# Patient Record
Sex: FEMALE
[De-identification: names, ages, dates, MRNs, and addresses within clinical notes are randomized per-mention and may not be internally consistent; named-entity substitution may affect disease eponyms.]

---

## 2020-08-06 ENCOUNTER — TRANSCRIPTION ENCOUNTER (OUTPATIENT)
Age: 50
End: 2020-08-06

## 2021-05-06 ENCOUNTER — EMERGENCY (EMERGENCY)
Facility: HOSPITAL | Age: 51
LOS: 1 days | Discharge: HOME | End: 2021-05-06
Attending: EMERGENCY MEDICINE
Payer: COMMERCIAL

## 2021-05-06 VITALS
RESPIRATION RATE: 20 BRPM | OXYGEN SATURATION: 98 % | SYSTOLIC BLOOD PRESSURE: 143 MMHG | HEART RATE: 67 BPM | DIASTOLIC BLOOD PRESSURE: 87 MMHG | WEIGHT: 184.97 LBS | HEIGHT: 65 IN | TEMPERATURE: 98 F

## 2021-05-06 PROCEDURE — 99283 EMERGENCY DEPT VISIT LOW MDM: CPT | Mod: 25

## 2021-05-06 PROCEDURE — 99284 EMERGENCY DEPT VISIT MOD MDM: CPT

## 2021-05-06 PROCEDURE — 96372 THER/PROPH/DIAG INJ SC/IM: CPT

## 2021-05-06 PROCEDURE — 72100 X-RAY EXAM L-S SPINE 2/3 VWS: CPT

## 2021-05-06 PROCEDURE — 72100 X-RAY EXAM L-S SPINE 2/3 VWS: CPT | Mod: 26

## 2021-05-06 RX ORDER — DEXAMETHASONE 0.5 MG/5ML
8 ELIXIR ORAL ONCE
Refills: 0 | Status: COMPLETED | OUTPATIENT
Start: 2021-05-06 | End: 2021-05-06

## 2021-05-06 RX ORDER — IBUPROFEN 200 MG
1 TABLET ORAL
Qty: 15 | Refills: 0
Start: 2021-05-06

## 2021-05-06 RX ORDER — LIDOCAINE 4 G/100G
1 CREAM TOPICAL
Qty: 10 | Refills: 0
Start: 2021-05-06 | End: 2021-05-15

## 2021-05-06 RX ORDER — METHOCARBAMOL 500 MG/1
1000 TABLET, FILM COATED ORAL ONCE
Refills: 0 | Status: COMPLETED | OUTPATIENT
Start: 2021-05-06 | End: 2021-05-06

## 2021-05-06 RX ORDER — METHOCARBAMOL 500 MG/1
1 TABLET, FILM COATED ORAL
Qty: 15 | Refills: 0
Start: 2021-05-06 | End: 2021-05-10

## 2021-05-06 RX ORDER — KETOROLAC TROMETHAMINE 30 MG/ML
30 SYRINGE (ML) INJECTION ONCE
Refills: 0 | Status: DISCONTINUED | OUTPATIENT
Start: 2021-05-06 | End: 2021-05-06

## 2021-05-06 RX ORDER — LIDOCAINE 4 G/100G
1 CREAM TOPICAL ONCE
Refills: 0 | Status: COMPLETED | OUTPATIENT
Start: 2021-05-06 | End: 2021-05-06

## 2021-05-06 RX ORDER — OXYCODONE AND ACETAMINOPHEN 5; 325 MG/1; MG/1
2 TABLET ORAL ONCE
Refills: 0 | Status: DISCONTINUED | OUTPATIENT
Start: 2021-05-06 | End: 2021-05-06

## 2021-05-06 RX ADMIN — METHOCARBAMOL 1000 MILLIGRAM(S): 500 TABLET, FILM COATED ORAL at 14:00

## 2021-05-06 RX ADMIN — OXYCODONE AND ACETAMINOPHEN 2 TABLET(S): 5; 325 TABLET ORAL at 12:27

## 2021-05-06 RX ADMIN — Medication 30 MILLIGRAM(S): at 12:27

## 2021-05-06 RX ADMIN — Medication 8 MILLIGRAM(S): at 14:00

## 2021-05-06 NOTE — ED PROVIDER NOTE - CLINICAL SUMMARY MEDICAL DECISION MAKING FREE TEXT BOX
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

## 2021-05-06 NOTE — ED PROVIDER NOTE - PATIENT PORTAL LINK FT
You can access the FollowMyHealth Patient Portal offered by [facility] by registering at the following website: [email or web address]. By joining [employer]’s FollowMyHealth portal, you will also be able to view your health information using other applications (apps) compatible with our system.

## 2021-05-06 NOTE — ED PROVIDER NOTE - OBJECTIVE STATEMENT
50 y.o female Pmh of HTN on med and hx of LBP S.p tripped and fell on the ICE 2016 have received the 3 dose of epidural injection last one was [id number]. pt presents in ER and c.o 2 days sever LBP goes to the right hip . 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 she came home and she got 2 motrin for the pain and went to bed . denies any recent fall or trauma or injury . pain is so sever 10/10 worse by movement and standing up from stinting position  and can not move now due to pain .   she denies any loss of bowel or bladder continence . denies any pregnancy s.p total hysterectomy

## 2021-05-06 NOTE — ED PROVIDER NOTE - PROGRESS NOTE DETAILS
pt is requesting image study, despite explanation that she did under treat the LBP . dose not need image study always

## 2021-05-06 NOTE — ED PROVIDER NOTE - NSFOLLOWUPINSTRUCTIONS_ED_ALL_ED_FT
Back Pain    Back pain is very common in adults. The cause of back pain is rarely dangerous and the pain often gets better over time. The cause of your back pain may not be known and may include strain of muscles or ligaments, degeneration of the spinal disks, or arthritis. Occasionally the pain may radiate down your leg(s). Over-the-counter medicines to reduce pain and inflammation are often the most helpful. Stretching and remaining active frequently helps the healing process.     SEEK IMMEDIATE MEDICAL CARE IF YOU HAVE ANY OF THE FOLLOWING SYMPTOMS: bowel or bladder control problems, unusual weakness or numbness in your arms or legs, nausea or vomiting, abdominal pain, fever, dizziness/lightheadedness. please make sure take medications prescribed for the pain   please avoid lifting bending  , bending and twisting   please call and follow up with primary care within 1-2days and follow up with spine      Back Pain    Back pain is very common in adults. The cause of back pain is rarely dangerous and the pain often gets better over time. The cause of your back pain may not be known and may include strain of muscles or ligaments, degeneration of the spinal disks, or arthritis. Occasionally the pain may radiate down your leg(s). Over-the-counter medicines to reduce pain and inflammation are often the most helpful. Stretching and remaining active frequently helps the healing process.     SEEK IMMEDIATE MEDICAL CARE IF YOU HAVE ANY OF THE FOLLOWING SYMPTOMS: bowel or bladder control problems, unusual weakness or numbness in your arms or legs, nausea or vomiting, abdominal pain, fever, dizziness/lightheadedness.    Constipation    Constipation is when a person has fewer than three bowel movements a week, has difficulty having a bowel movement, or has stools that are dry, hard, or larger than normal. Other symptoms can include abdominal pain or bloating. As people grow older, constipation is more common. A low-fiber diet, not taking in enough fluids, and taking certain medicines, including opioid painkillers, may make constipation worse. Treatment varies but may include dietary modifications (more fiber-rich foods), lifestyle modifications, and possible medications.     SEEK IMMEDIATE MEDICAL CARE IF YOU HAVE ANY OF THE FOLLOWING SYMPTOMS: bright red blood in your stool, constipation for longer than 4 days, abdominal or rectal pain, unexplained weight loss, or inability to pass gas.

## 2021-05-06 NOTE — ED PROVIDER NOTE - CARE PROVIDER_API CALL
Norris Anderson; PhD)  Neurosurgery  [address] [place] [address]  Phone: [phone]  Fax: [phone]  Follow Up Time:

## 2021-05-06 NOTE — ED PROVIDER NOTE - PHYSICAL EXAMINATION
Const: AOX3 nontoxic appearing, no apparent respiratory or physical distress. sitting on the wheelchair   Eyes: YSABEL. EOMI  Neck: Soft and supple. Full ROM without pain.  Cardiac: Regular rate and regular rhythm. +S1/S2.   Resp: Speaking in full sentences. No evidence of respiratory distress.   Abd: Soft, non-tender, non-distended. Normal bowel sounds in all 4 quadrants. No guarding or rebound.  Back: Spine midline and non-tender. No CVAT. LE strength grossly intact , light touch sensation grossly intact ,    Skin: No evidence rashes,   Neuro: Awake, alert & oriented x 3. Moves all extremities symmetrically. moves LE with pain in lower back

## 2021-05-06 NOTE — ED PROVIDER NOTE - ATTENDING CONTRIBUTION TO CARE
MD ACP  acute exacerbation of chronic back pain  not traumatic  pe as doc  agree w ith treatment and dispo

## 2021-05-10 PROBLEM — Z00.00 ENCOUNTER FOR PREVENTIVE HEALTH EXAMINATION: Status: ACTIVE | Noted: 2021-05-10

## 2021-05-12 ENCOUNTER — APPOINTMENT (OUTPATIENT)
Dept: NEUROSURGERY | Facility: CLINIC | Age: 51
End: 2021-05-12

## 2024-07-26 ENCOUNTER — INPATIENT (INPATIENT)
Facility: HOSPITAL | Age: 54
LOS: 2 days | Discharge: HOME | End: 2024-07-29
Attending: STUDENT IN AN ORGANIZED HEALTH CARE EDUCATION/TRAINING PROGRAM | Admitting: INTERNAL MEDICINE
Payer: MEDICARE

## 2024-07-26 ENCOUNTER — RESULT REVIEW (OUTPATIENT)
Age: 54
End: 2024-07-26

## 2024-07-26 VITALS
TEMPERATURE: 98 F | WEIGHT: 169.98 LBS | RESPIRATION RATE: 20 BRPM | OXYGEN SATURATION: 100 % | DIASTOLIC BLOOD PRESSURE: 140 MMHG | SYSTOLIC BLOOD PRESSURE: 204 MMHG | HEIGHT: 66 IN | HEART RATE: 80 BPM

## 2024-07-26 PROBLEM — I10 ESSENTIAL (PRIMARY) HYPERTENSION: Chronic | Status: ACTIVE | Noted: 2021-05-06

## 2024-07-26 LAB
ALBUMIN SERPL ELPH-MCNC: 3.5 G/DL — SIGNIFICANT CHANGE UP (ref 3.3–5)
ALBUMIN SERPL ELPH-MCNC: 4.3 G/DL — SIGNIFICANT CHANGE UP (ref 3.3–5)
ALP SERPL-CCNC: 106 U/L — SIGNIFICANT CHANGE UP (ref 40–120)
ALP SERPL-CCNC: 79 U/L — SIGNIFICANT CHANGE UP (ref 40–120)
ALT FLD-CCNC: 17 U/L — SIGNIFICANT CHANGE UP (ref 12–78)
ALT FLD-CCNC: 17 U/L — SIGNIFICANT CHANGE UP (ref 12–78)
ANION GAP SERPL CALC-SCNC: 11 MMOL/L — SIGNIFICANT CHANGE UP (ref 5–17)
ANION GAP SERPL CALC-SCNC: 4 MMOL/L — LOW (ref 5–17)
APPEARANCE UR: CLEAR — SIGNIFICANT CHANGE UP
APTT BLD: 25.4 SEC — SIGNIFICANT CHANGE UP (ref 24.5–35.6)
AST SERPL-CCNC: 27 U/L — SIGNIFICANT CHANGE UP (ref 15–37)
AST SERPL-CCNC: 47 U/L — HIGH (ref 15–37)
BASOPHILS # BLD AUTO: 0.02 K/UL — SIGNIFICANT CHANGE UP (ref 0–0.2)
BASOPHILS # BLD AUTO: 0.04 K/UL — SIGNIFICANT CHANGE UP (ref 0–0.2)
BASOPHILS NFR BLD AUTO: 0.2 % — SIGNIFICANT CHANGE UP (ref 0–2)
BASOPHILS NFR BLD AUTO: 0.4 % — SIGNIFICANT CHANGE UP (ref 0–2)
BILIRUB SERPL-MCNC: 0.6 MG/DL — SIGNIFICANT CHANGE UP (ref 0.2–1.2)
BILIRUB SERPL-MCNC: 0.7 MG/DL — SIGNIFICANT CHANGE UP (ref 0.2–1.2)
BILIRUB UR-MCNC: NEGATIVE — SIGNIFICANT CHANGE UP
BUN SERPL-MCNC: 16 MG/DL — SIGNIFICANT CHANGE UP (ref 7–23)
BUN SERPL-MCNC: 21 MG/DL — SIGNIFICANT CHANGE UP (ref 7–23)
CALCIUM SERPL-MCNC: 10.6 MG/DL — HIGH (ref 8.5–10.1)
CALCIUM SERPL-MCNC: 9.8 MG/DL — SIGNIFICANT CHANGE UP (ref 8.5–10.1)
CHLORIDE SERPL-SCNC: 103 MMOL/L — SIGNIFICANT CHANGE UP (ref 96–108)
CHLORIDE SERPL-SCNC: 108 MMOL/L — SIGNIFICANT CHANGE UP (ref 96–108)
CK MB BLD-MCNC: 1.4 % — SIGNIFICANT CHANGE UP (ref 0–3.5)
CK MB CFR SERPL CALC: 1.9 NG/ML — SIGNIFICANT CHANGE UP (ref 0.5–3.6)
CK SERPL-CCNC: 132 U/L — SIGNIFICANT CHANGE UP (ref 26–192)
CK SERPL-CCNC: 207 U/L — HIGH (ref 26–192)
CO2 SERPL-SCNC: 25 MMOL/L — SIGNIFICANT CHANGE UP (ref 22–31)
CO2 SERPL-SCNC: 27 MMOL/L — SIGNIFICANT CHANGE UP (ref 22–31)
COLOR SPEC: YELLOW — SIGNIFICANT CHANGE UP
CREAT SERPL-MCNC: 1.5 MG/DL — HIGH (ref 0.5–1.3)
CREAT SERPL-MCNC: 1.53 MG/DL — HIGH (ref 0.5–1.3)
DIFF PNL FLD: NEGATIVE — SIGNIFICANT CHANGE UP
EGFR: 40 ML/MIN/1.73M2 — LOW
EGFR: 41 ML/MIN/1.73M2 — LOW
EOSINOPHIL # BLD AUTO: 0.1 K/UL — SIGNIFICANT CHANGE UP (ref 0–0.5)
EOSINOPHIL # BLD AUTO: 0.28 K/UL — SIGNIFICANT CHANGE UP (ref 0–0.5)
EOSINOPHIL NFR BLD AUTO: 1 % — SIGNIFICANT CHANGE UP (ref 0–6)
EOSINOPHIL NFR BLD AUTO: 3.1 % — SIGNIFICANT CHANGE UP (ref 0–6)
GLUCOSE SERPL-MCNC: 109 MG/DL — HIGH (ref 70–99)
GLUCOSE SERPL-MCNC: 85 MG/DL — SIGNIFICANT CHANGE UP (ref 70–99)
GLUCOSE UR QL: NEGATIVE MG/DL — SIGNIFICANT CHANGE UP
HCT VFR BLD CALC: 33.7 % — LOW (ref 34.5–45)
HCT VFR BLD CALC: 35.4 % — SIGNIFICANT CHANGE UP (ref 34.5–45)
HGB BLD-MCNC: 10.9 G/DL — LOW (ref 11.5–15.5)
HGB BLD-MCNC: 11.3 G/DL — LOW (ref 11.5–15.5)
IMM GRANULOCYTES NFR BLD AUTO: 0.2 % — SIGNIFICANT CHANGE UP (ref 0–0.9)
IMM GRANULOCYTES NFR BLD AUTO: 0.2 % — SIGNIFICANT CHANGE UP (ref 0–0.9)
INR BLD: 1.1 RATIO — SIGNIFICANT CHANGE UP (ref 0.85–1.18)
KETONES UR-MCNC: NEGATIVE MG/DL — SIGNIFICANT CHANGE UP
LEUKOCYTE ESTERASE UR-ACNC: NEGATIVE — SIGNIFICANT CHANGE UP
LYMPHOCYTES # BLD AUTO: 2.41 K/UL — SIGNIFICANT CHANGE UP (ref 1–3.3)
LYMPHOCYTES # BLD AUTO: 2.74 K/UL — SIGNIFICANT CHANGE UP (ref 1–3.3)
LYMPHOCYTES # BLD AUTO: 26.4 % — SIGNIFICANT CHANGE UP (ref 13–44)
LYMPHOCYTES # BLD AUTO: 26.5 % — SIGNIFICANT CHANGE UP (ref 13–44)
MAGNESIUM SERPL-MCNC: 2.1 MG/DL — SIGNIFICANT CHANGE UP (ref 1.6–2.6)
MCHC RBC-ENTMCNC: 24.6 PG — LOW (ref 27–34)
MCHC RBC-ENTMCNC: 25.1 PG — LOW (ref 27–34)
MCHC RBC-ENTMCNC: 31.9 G/DL — LOW (ref 32–36)
MCHC RBC-ENTMCNC: 32.3 G/DL — SIGNIFICANT CHANGE UP (ref 32–36)
MCV RBC AUTO: 77.1 FL — LOW (ref 80–100)
MCV RBC AUTO: 77.5 FL — LOW (ref 80–100)
MONOCYTES # BLD AUTO: 0.49 K/UL — SIGNIFICANT CHANGE UP (ref 0–0.9)
MONOCYTES # BLD AUTO: 0.63 K/UL — SIGNIFICANT CHANGE UP (ref 0–0.9)
MONOCYTES NFR BLD AUTO: 4.7 % — SIGNIFICANT CHANGE UP (ref 2–14)
MONOCYTES NFR BLD AUTO: 6.9 % — SIGNIFICANT CHANGE UP (ref 2–14)
NEUTROPHILS # BLD AUTO: 5.77 K/UL — SIGNIFICANT CHANGE UP (ref 1.8–7.4)
NEUTROPHILS # BLD AUTO: 6.96 K/UL — SIGNIFICANT CHANGE UP (ref 1.8–7.4)
NEUTROPHILS NFR BLD AUTO: 63.2 % — SIGNIFICANT CHANGE UP (ref 43–77)
NEUTROPHILS NFR BLD AUTO: 67.2 % — SIGNIFICANT CHANGE UP (ref 43–77)
NITRITE UR-MCNC: NEGATIVE — SIGNIFICANT CHANGE UP
NRBC # BLD: 0 /100 WBCS — SIGNIFICANT CHANGE UP (ref 0–0)
NRBC # BLD: 0 /100 WBCS — SIGNIFICANT CHANGE UP (ref 0–0)
NT-PROBNP SERPL-SCNC: 330 PG/ML — HIGH (ref 0–125)
PH UR: 7.5 — SIGNIFICANT CHANGE UP (ref 5–8)
PHOSPHATE SERPL-MCNC: 3.7 MG/DL — SIGNIFICANT CHANGE UP (ref 2.5–4.5)
PLATELET # BLD AUTO: 368 K/UL — SIGNIFICANT CHANGE UP (ref 150–400)
PLATELET # BLD AUTO: SIGNIFICANT CHANGE UP K/UL (ref 150–400)
POTASSIUM SERPL-MCNC: 2.9 MMOL/L — CRITICAL LOW (ref 3.5–5.3)
POTASSIUM SERPL-MCNC: 3.8 MMOL/L — SIGNIFICANT CHANGE UP (ref 3.5–5.3)
POTASSIUM SERPL-SCNC: 2.9 MMOL/L — CRITICAL LOW (ref 3.5–5.3)
POTASSIUM SERPL-SCNC: 3.8 MMOL/L — SIGNIFICANT CHANGE UP (ref 3.5–5.3)
PROT SERPL-MCNC: 7.8 GM/DL — SIGNIFICANT CHANGE UP (ref 6–8.3)
PROT SERPL-MCNC: 9.4 GM/DL — HIGH (ref 6–8.3)
PROT UR-MCNC: NEGATIVE MG/DL — SIGNIFICANT CHANGE UP
PROTHROM AB SERPL-ACNC: 13 SEC — SIGNIFICANT CHANGE UP (ref 9.5–13)
RBC # BLD: 4.35 M/UL — SIGNIFICANT CHANGE UP (ref 3.8–5.2)
RBC # BLD: 4.59 M/UL — SIGNIFICANT CHANGE UP (ref 3.8–5.2)
RBC # FLD: 15.5 % — HIGH (ref 10.3–14.5)
RBC # FLD: 15.5 % — HIGH (ref 10.3–14.5)
SODIUM SERPL-SCNC: 139 MMOL/L — SIGNIFICANT CHANGE UP (ref 135–145)
SODIUM SERPL-SCNC: 139 MMOL/L — SIGNIFICANT CHANGE UP (ref 135–145)
SP GR SPEC: 1.02 — SIGNIFICANT CHANGE UP (ref 1–1.03)
TROPONIN I, HIGH SENSITIVITY RESULT: 3162.1 NG/L — HIGH
TROPONIN I, HIGH SENSITIVITY RESULT: 3517.4 NG/L — HIGH
TROPONIN I, HIGH SENSITIVITY RESULT: 4257.7 NG/L — HIGH
UROBILINOGEN FLD QL: 0.2 MG/DL — SIGNIFICANT CHANGE UP (ref 0.2–1)
WBC # BLD: 10.35 K/UL — SIGNIFICANT CHANGE UP (ref 3.8–10.5)
WBC # BLD: 9.13 K/UL — SIGNIFICANT CHANGE UP (ref 3.8–10.5)
WBC # FLD AUTO: 10.35 K/UL — SIGNIFICANT CHANGE UP (ref 3.8–10.5)
WBC # FLD AUTO: 9.13 K/UL — SIGNIFICANT CHANGE UP (ref 3.8–10.5)

## 2024-07-26 PROCEDURE — 71275 CT ANGIOGRAPHY CHEST: CPT | Mod: 26,MC

## 2024-07-26 PROCEDURE — 70450 CT HEAD/BRAIN W/O DYE: CPT | Mod: 26,MC

## 2024-07-26 PROCEDURE — 74174 CTA ABD&PLVS W/CONTRAST: CPT | Mod: 26,MC

## 2024-07-26 PROCEDURE — 93306 TTE W/DOPPLER COMPLETE: CPT | Mod: 26

## 2024-07-26 PROCEDURE — 99291 CRITICAL CARE FIRST HOUR: CPT

## 2024-07-26 PROCEDURE — 93010 ELECTROCARDIOGRAM REPORT: CPT | Mod: 76

## 2024-07-26 PROCEDURE — 99285 EMERGENCY DEPT VISIT HI MDM: CPT | Mod: FS

## 2024-07-26 RX ORDER — HYDRALAZINE HYDROCHLORIDE 100 MG/1
10 TABLET ORAL ONCE
Refills: 0 | Status: COMPLETED | OUTPATIENT
Start: 2024-07-26 | End: 2024-07-26

## 2024-07-26 RX ORDER — POTASSIUM CHLORIDE 1500 MG/1
40 TABLET, EXTENDED RELEASE ORAL ONCE
Refills: 0 | Status: COMPLETED | OUTPATIENT
Start: 2024-07-27 | End: 2024-07-26

## 2024-07-26 RX ORDER — ATORVASTATIN CALCIUM 40 MG/1
40 TABLET, FILM COATED ORAL AT BEDTIME
Refills: 0 | Status: DISCONTINUED | OUTPATIENT
Start: 2024-07-26 | End: 2024-07-29

## 2024-07-26 RX ORDER — POTASSIUM CHLORIDE 1500 MG/1
40 TABLET, EXTENDED RELEASE ORAL ONCE
Refills: 0 | Status: COMPLETED | OUTPATIENT
Start: 2024-07-26 | End: 2024-07-26

## 2024-07-26 RX ORDER — ASPIRIN 500 MG
324 TABLET ORAL ONCE
Refills: 0 | Status: COMPLETED | OUTPATIENT
Start: 2024-07-26 | End: 2024-07-26

## 2024-07-26 RX ORDER — POTASSIUM CHLORIDE 1500 MG/1
40 TABLET, EXTENDED RELEASE ORAL ONCE
Refills: 0 | Status: DISCONTINUED | OUTPATIENT
Start: 2024-07-26 | End: 2024-07-26

## 2024-07-26 RX ORDER — POTASSIUM CHLORIDE 1500 MG/1
10 TABLET, EXTENDED RELEASE ORAL
Refills: 0 | Status: DISCONTINUED | OUTPATIENT
Start: 2024-07-26 | End: 2024-07-26

## 2024-07-26 RX ORDER — ASPIRIN 500 MG
81 TABLET ORAL DAILY
Refills: 0 | Status: DISCONTINUED | OUTPATIENT
Start: 2024-07-26 | End: 2024-07-29

## 2024-07-26 RX ORDER — CHLORHEXIDINE GLUCONATE 500 MG/1
1 CLOTH TOPICAL
Refills: 0 | Status: DISCONTINUED | OUTPATIENT
Start: 2024-07-26 | End: 2024-07-29

## 2024-07-26 RX ORDER — HEPARIN SODIUM 1000 [USP'U]/ML
5000 INJECTION, SOLUTION INTRAVENOUS; SUBCUTANEOUS EVERY 12 HOURS
Refills: 0 | Status: DISCONTINUED | OUTPATIENT
Start: 2024-07-26 | End: 2024-07-29

## 2024-07-26 RX ORDER — ONDANSETRON HCL/PF 4 MG/2 ML
4 VIAL (ML) INJECTION ONCE
Refills: 0 | Status: COMPLETED | OUTPATIENT
Start: 2024-07-26 | End: 2024-07-26

## 2024-07-26 RX ADMIN — Medication 4 MILLIGRAM(S): at 13:51

## 2024-07-26 RX ADMIN — HEPARIN SODIUM 5000 UNIT(S): 1000 INJECTION, SOLUTION INTRAVENOUS; SUBCUTANEOUS at 18:20

## 2024-07-26 RX ADMIN — POTASSIUM CHLORIDE 40 MILLIEQUIVALENT(S): 1500 TABLET, EXTENDED RELEASE ORAL at 23:39

## 2024-07-26 RX ADMIN — CHLORHEXIDINE GLUCONATE 1 APPLICATION(S): 500 CLOTH TOPICAL at 16:09

## 2024-07-26 RX ADMIN — POTASSIUM CHLORIDE 40 MILLIEQUIVALENT(S): 1500 TABLET, EXTENDED RELEASE ORAL at 20:18

## 2024-07-26 RX ADMIN — Medication 4 MILLIGRAM(S): at 14:30

## 2024-07-26 RX ADMIN — Medication 324 MILLIGRAM(S): at 13:44

## 2024-07-26 RX ADMIN — HYDRALAZINE HYDROCHLORIDE 10 MILLIGRAM(S): 100 TABLET ORAL at 20:18

## 2024-07-26 RX ADMIN — HYDRALAZINE HYDROCHLORIDE 10 MILLIGRAM(S): 100 TABLET ORAL at 16:46

## 2024-07-26 RX ADMIN — ATORVASTATIN CALCIUM 40 MILLIGRAM(S): 40 TABLET, FILM COATED ORAL at 21:58

## 2024-07-26 RX ADMIN — HYDRALAZINE HYDROCHLORIDE 10 MILLIGRAM(S): 100 TABLET ORAL at 13:35

## 2024-07-26 RX ADMIN — POTASSIUM CHLORIDE 100 MILLIEQUIVALENT(S): 1500 TABLET, EXTENDED RELEASE ORAL at 20:31

## 2024-07-26 RX ADMIN — Medication 4 MILLIGRAM(S): at 13:45

## 2024-07-26 NOTE — CONSULT NOTE ADULT - SUBJECTIVE AND OBJECTIVE BOX
VIVI WILKINS  [id number]    Date of Consult:  7/36/24  CHIEF COMPLAINT:  HTN emergency  HISTORY OF PRESENT ILLNESS:  54F with HTN, congenital single kidney p/w HA blurry vison, found to have htn emergency, bp on arrival 229/141. pt state sshe is not on any meds at home for bp, doesnt want to take meds. does not check her bp at home. pt states she has been hospitalized prev for HTN  pt with HA and blurry vison, no cp or sob. currently bp is 170s systolic, pt uncomfortable appearing but states she feels better now than when she came in. Cr 1.53 trop 3517. , EKG with non specific st changes     Allergies    No Known Allergies    Intolerances    	    MEDICATIONS:  heparin   Injectable 5000 Unit(s) SubCutaneous every 12 hours              chlorhexidine 2% Cloths 1 Application(s) Topical <User Schedule>      PAST MEDICAL & SURGICAL HISTORY:  Essential hypertension      No significant past surgical history          FAMILY HISTORY:  mother CV dz    SOCIAL HISTORY:    denies tob etoh drugs    REVIEW OF SYSTEMS:  See HPI. Otherwise, 10 point ROS done and otherwise negative.    PHYSICAL EXAM:  T(C): 36.6 (07-26-24 @ 11:28), Max: 36.6 (07-26-24 @ 11:28)  HR: 90 (07-26-24 @ 15:00) (65 - 90)  BP: 190/127 (07-26-24 @ 15:00) (129/119 - 229/141)  RR: 18 (07-26-24 @ 15:00) (13 - 20)  SpO2: 98% (07-26-24 @ 15:00) (96% - 100%)  Wt(kg): --  I&O's Summary      Appearance: Normal	  HEENT:   Normal oral mucosa, PERRL, EOMI	  Lymphatic: No lymphadenopathy  Cardiovascular: Normal S1 S2, No JVD, No murmurs, No edema  Respiratory: Lungs clear to auscultation	  Psychiatry: A & O x 3, Mood & affect appropriate  Gastrointestinal:  Soft, Non-tender, + BS	  Skin: No rashes, No ecchymoses, No cyanosis	  Neurologic: Non-focal  Extremities: Normal range of motion        LABS:	 	    CBC Full  -  ( 26 Jul 2024 12:10 )  WBC Count : 9.13 K/uL  Hemoglobin : 10.9 g/dL  Hematocrit : 33.7 %  Platelet Count - Automated : CLUMPED K/uL  Mean Cell Volume : 77.5 fl  Mean Cell Hemoglobin : 25.1 pg  Mean Cell Hemoglobin Concentration : 32.3 g/dL  Auto Neutrophil # : 5.77 K/uL  Auto Lymphocyte # : 2.41 K/uL  Auto Monocyte # : 0.63 K/uL  Auto Eosinophil # : 0.28 K/uL  Auto Basophil # : 0.02 K/uL  Auto Neutrophil % : 63.2 %  Auto Lymphocyte % : 26.4 %  Auto Monocyte % : 6.9 %  Auto Eosinophil % : 3.1 %  Auto Basophil % : 0.2 %    07-26    139  |  108  |  21  ----------------------------<  109<H>  3.8   |  27  |  1.53<H>    Ca    9.8      26 Jul 2024 12:10    TPro  7.8  /  Alb  3.5  /  TBili  0.7  /  DBili  x   /  AST  47<H>  /  ALT  17  /  AlkPhos  79  07-26      proBNP:   Lipid Profile:   HgA1c:   TSH:       CARDIAC MARKERS:            TELEMETRY: 	    ECG:  	  RADIOLOGY:  OTHER: 	    PREVIOUS DIAGNOSTIC TESTING:    [ ] Echocardiogram:  [ ]  Catheterization:  [ ] Stress Test:  	  	  ASSESSMENT/PLAN: 	    [place] MD

## 2024-07-26 NOTE — ED ADULT NURSE NOTE - ED STAT RN HANDOFF DETAILS
Report given to receiving RN James ,pts history, current condition and reason for admission discussed, safety concerns addressed and reviewed, pt currently in stable condition, IV flushes for patency and site shows no signs or symptoms of infiltrate, dressing is clean dry and intact, pt is aware of plan of care. Pt education deemed successful at time of report after patient demonstrates successful teach back for proficiency.

## 2024-07-26 NOTE — PROVIDER CONTACT NOTE (EICU) - ACTION/TREATMENT ORDERED:
Ordered K-Clor 40mEq PO
Initiate Treatment: DSFS oil qh with flares
Render In Strict Bullet Format?: No
Detail Level: Zone

## 2024-07-26 NOTE — H&P ADULT - HISTORY OF PRESENT ILLNESS
54 year old female with a PMHX HTN noncompliant with medications, angina, renal agenesis, asthma (albuterol at home), depression, and anxiety presents to the [facility] w/ headache, nausea, and vomiting. Pt states she was visiting a friend today and went up 4 steps when she suddenly developed chest pain, severe headache, N/V. On arrival EMS found BP to be 240/120, pt was given 3 so sublingual nitro. Pt has stopped taking all her anti-hypertensive meds for the last month and started taking herbal teas instead (bush and garlic tea). Reports baseline BP at home is around 180-190. Describes chest pain as pressure sensation. Denies chills, fever, dizziness, abdominal pain.     In ED pt with received morphine with resolution of chest pain and headache. S/p aspirin and 10mg hydralazine. CT head/chest/abd/pelvis with no acute findings. Labs remarkable for troponins 3517.4, CK; 207, BNP: 330, Cr; 1.53. Admit to ICU for further management.

## 2024-07-26 NOTE — H&P ADULT - NSHPLABSRESULTS_GEN_ALL_CORE
LABS:  07-26 @ 12:10 Creatine 207 U/L<H> [26 - 192]  cret                        10.9   9.13  )-----------( CLUMPED    ( 26 Jul 2024 12:10 )             33.7     07-26    139  |  108  |  21  ----------------------------<  109<H>  3.8   |  27  |  1.53<H>    Ca    9.8      26 Jul 2024 12:10    TPro  7.8  /  Alb  3.5  /  TBili  0.7  /  DBili  x   /  AST  47<H>  /  ALT  17  /  AlkPhos  79  07-26    PT/INR - ( 26 Jul 2024 13:10 )   PT: 13.0 sec;   INR: 1.10 ratio         PTT - ( 26 Jul 2024 13:10 )  PTT:25.4 sec

## 2024-07-26 NOTE — H&P ADULT - NSHPPHYSICALEXAM_GEN_ALL_CORE
GENERAL: NAD, lying in bed comfortably  HEAD:  Atraumatic, normocephalic  EYES: EOMI, PERRLA, conjunctiva and sclera clear  NECK: Supple, trachea midline, no JVD  HEART: Regular rate and rhythm  LUNGS: Unlabored respirations.  Clear to auscultation bilaterally, no crackles, wheezing, or rhonchi  ABDOMEN: Soft, nontender, nondistended, +BS  EXTREMITIES: No clubbing, cyanosis, or edema  NERVOUS SYSTEM:  A&Ox3, moving all extremities, no focal deficits

## 2024-07-26 NOTE — PATIENT PROFILE ADULT - FUNCTIONAL ASSESSMENT - BASIC MOBILITY 6.
3-calculated by average/Not able to assess (calculate score using [facility] averaging method)

## 2024-07-26 NOTE — ED ADULT NURSE NOTE - CHIEF COMPLAINT QUOTE
BIBEMS c/o chest pain, nausea, headahce, dizziness started 45 mins ago. as per EMS, was called for stressful call "someone broke into house". as pt was running upstairs started to get symptoms. pt noncomplaint with BP meds x 2 weeks. 4 SL nitro given from EMS. hx: htn, one kidney

## 2024-07-26 NOTE — ED ADULT TRIAGE NOTE - CHIEF COMPLAINT QUOTE
BIBEMS c/o chest pain, nausea, headahce, dizziness started 45 mins ago. as per EMS, was called for stressful call "someone broke into house". as pt was running upstairs started to get symptoms. pt noncomplaint with BP meds x 2 weeks. 4 SL nitro given from EMS. hx: htn, one kidney BIBEMS c/o chest pain, nausea, headahce, dizziness started 45 mins ago. as per EMS, was called for stressful call "someone broke into house in [place]". as pt was running upstairs started to get symptoms. pt noncomplaint with BP meds x 2 weeks. 4 SL nitro given from EMS. hx: htn, one kidney

## 2024-07-26 NOTE — ED PROVIDER NOTE - NS ED ATTENDING STATEMENT MOD
I have seen and examined this patient and fully participated in the care of this patient as the teaching attending.  The service was shared with the ALBERT.  I reviewed and verified the documentation.

## 2024-07-26 NOTE — ED PROVIDER NOTE - CLINICAL SUMMARY MEDICAL DECISION MAKING FREE TEXT BOX
54F w/ PMH HTN (non-compliant w/ medications), Angina, Renal Agenesis, Asthma, Depression/Anxiety who presents to ED w/ chest pain x this AM associated w/ nausea/vomiting and headaches. Pt states she visited a friend today, went up approx. 4 steps, when she began to experience all her symptoms. Pt reports midsternal chest pain, non-radiating, non-pleuritic, exertional in nature. Pt w/ generalized diffuse severe headache. Reports nausea w/ multiple episodes of NBNB emesis. Of note - pt DC'd HTN medications on her own and started using herbal supplements/teas. On EMS arrival - /120 and patient received 3 doses of Nitro. Denies fever, chills, shortness of breath, abdominal pain, diarrhea, dysuria, urinary frequency/urgency, extremity weakness/numbness/tingling, lightheadedness, dizziness, or vision changes.    Patient currently afebrile, hypertensive, spO2 100%. On PE - pt in no acute distress, heart w/ RRR, chest symmetrical, non-labored breathing, lungs clear bilaterally, abdomen soft/non-distended/non-tender to palpation, no focal neurological deficits. Based on history and physical, differentials include but are not limited to medication non-compliance, hypertension/HTN emergency/urgency, electrolyte abnormality, anemia, ACS, ICH. Plan to assess patient for acute pathology as listed above with Labs, EKG, CXR, CT Head. Will administer medications for symptomatic relief, follow-up on results, and reassess. Disposition pending workup/re-evaluation.

## 2024-07-26 NOTE — ED PROVIDER NOTE - ATTENDING CONTRIBUTION TO CARE
54 year old female with h/o htn, noncompliant with meds x 1 month, using herbal medications at home presented today with uncontrolled htn, severe headache, visual changes, nause 54 year old female with h/o htn, noncompliant with meds x 1 month, using herbal medications at home presented today with uncontrolled htn, severe headache, visual changes, nausea and nonradiating chest pain.  p 54 year old female with h/o htn, noncompliant with meds x 1 month, using herbal medications at home presented today with uncontrolled htn, severe headache, visual changes, nausea and nonradiating chest pain.  pt was given 3 nitro by EMS in route which improved her chest pain but her headache persisted, ekg shows a NSR @ 63 bpm, no stemi, t inversions in the inferolateral leads. hydralazine given for her bp, morphine for pain with improvement, ct head/chest/abd/pelvis negative, labs reviewed, trop is >3000, creatinine 1.5. ICU consulted, pt accepted for further management

## 2024-07-26 NOTE — CONSULT NOTE ADULT - ASSESSMENT
54F with HTN, congenital single kidney p/w HA blurry vison, found to have htn emergency, bp on arrival 229/141.     HTN emergency  -no cp or sob  -CTA showing no aortic dissection  -not on any meds at home, received IV hydral here  -discussed with ED team, pt going to MICU, plan for nitro drip, or esmolol drip, no objections to drip  -would start PO meds once stabilized to transition. can also give IV hydral in addition to po hydral  -no cp, trop noted  trend trop with CK and CKMB  -given no cp and degree of HTN, would hold off on a/c given concern for cerebral hemorrhage. initial ct head reviewed   -pt states congential single kidney, pt advised that uncontrolled HTN will severely reduce kidney function consider renal eval here as well.  -pt going to ICU, cont supportive care per ICU team   -low threshold to repeat imaging   -will follow

## 2024-07-26 NOTE — H&P ADULT - ASSESSMENT
54 year old female with a PMHX HTN noncompliant with medications, angina, renal agenesis, asthma (albuterol at home), depression, and anxiety presents to the [facility] EMS w/ headache, nausea, and vomiting. Found to be in hypertensive emergency and NSTEMI admitted to the ICU for further management.    # Neuro:  -A/Ox3, appears at baseline   - CTH with no acute findings  - Pt with headache which has subsided s/p morphine in ED. Will continue to tx pain PRN.   - Will be cautious with morphine use as it effects BP, use tylenol prn     #Resp:  -satting adequately on RA, maintain sats>92%   - incentive spirometry   - CT chest with no acute findings     #CV:  //HTN emergency 2/2 medication noncompliance  - stopped htn meds for the last month only using herbal remedies. Supposed to be on lisinopril, hydrochlorotiazide, carvedilol at home.   - BP on EMS arrival 240/120--> s/p 10mg hydralazine IVP in ED  - Will hold off on cardene gtt at this time as pts last BP reading was 190/127  - GOAL systolic of 180-190  - MAP >65  - Will hold all PO antihypertensive at this time and add back as needed     //NSTEMI  - Troponin elevated: 3517.4, will continue to trend cardiac enzymes   - EKG with diffused T-wave inversions   - W/ chest pain, s/p 3 sublingual nitro w/ EMS, CP now resolved   - ? prior MI: CT chest w/ "Curvilinear hypoattenuation along theleft ventricular apex, likely sequela of prior myocardial infarction."  - Cardiology consult placed, will appreciate recs   - No known hx of HF, had prior stress test in april 2024 unknown results  - F/u officialTTE  - ASA, statin     #GI:  -Diet: PO   - CT abd/pelvis with no dissection     #Renal:  //MOOSE  -Baseline has solitary right kidney  - Unknown baseline cr, on admission cr 1.53  - Continue to trend renal fxn, avoid nephrotoxic agents   - cont to monitor strict I/Os  - voiding freely, making adequate UO  - replete lytes as appropriate     #ID:  - afebrile, wbc nl  - cont to monitor off abx     #Heme:  //DVT ppx: HSQ  - cbc stable    #Endo:  - maintaining goal glucose<180 with ISS  - cont to monitor FS    #Dispo:  - Admit to ICU for closer blood pressure monitoring     Case discussed with ICU attending, Dr. Short  54 year old female with a PMHX HTN noncompliant with medications, angina, renal agenesis, asthma (albuterol at home), depression, and anxiety presents to the [facility] EMS w/ headache, nausea, and vomiting. Found to be in hypertensive emergency and NSTEMI admitted to the ICU for further management.    # Neuro:  -A/Ox3, appears at baseline   - CTH with no acute findings  - Pt with headache which has subsided s/p morphine in ED. Will continue to tx pain PRN.   - Will be cautious with morphine use as it effects BP, use tylenol prn     #Resp:  -satting adequately on RA, maintain sats>92%   - incentive spirometry   - CT chest with no acute findings     #CV:  //HTN emergency 2/2 medication noncompliance  - stopped htn meds for the last month only using herbal remedies. Supposed to be on lisinopril, hydrochlorotiazide, carvedilol at home.   - BP on EMS arrival 240/120--> s/p 10mg hydralazine IVP in ED  - Will hold off on cardene gtt at this time as pts last BP reading was 190/127  - GOAL systolic of 180-190  - Will hold all PO antihypertensive at this time and add back as needed     //NSTEMI  - Troponin elevated: 3517.4, will continue to trend cardiac enzymes   - EKG with diffused T-wave inversions   - W/ chest pain, s/p 3 sublingual nitro w/ EMS, CP now resolved   - ? prior MI: CT chest w/ "Curvilinear hypoattenuation along theleft ventricular apex, likely sequela of prior myocardial infarction."  - Cardiology consult placed, will appreciate recs   - No known hx of HF, had prior stress test in april 2024 unknown results  - F/u officialTTE  - ASA, statin     #GI:  -Diet: PO   - CT abd/pelvis with no dissection     #Renal:  //MOOSE  -Baseline has solitary right kidney  - Unknown baseline cr, on admission cr 1.53  - Continue to trend renal fxn, avoid nephrotoxic agents   - cont to monitor strict I/Os  - voiding freely, making adequate UO  - replete lytes as appropriate     #ID:  - afebrile, wbc nl  - cont to monitor off abx     #Heme:  //DVT ppx: HSQ  - cbc stable    #Endo:  - maintaining goal glucose<180 with ISS  - cont to monitor FS    #Dispo:  - Admit to ICU for closer blood pressure monitoring     Case discussed with ICU attending, Dr. Short  54 year old female with a PMHX HTN noncompliant with medications, angina, renal agenesis, asthma (albuterol at home), depression, and anxiety presents to the [facility] EMS w/ headache, nausea, and vomiting. Found to be in hypertensive emergency and NSTEMI admitted to the ICU for further management.    # Neuro:  -A/Ox3, appears at baseline   - CTH with no acute findings  - Pt with headache which has subsided s/p morphine in ED. Will continue to tx pain PRN.   - Will be cautious with morphine use as it effects BP, use tylenol prn     #Resp:  -satting adequately on RA, maintain sats>92%   - incentive spirometry   - CT chest with no acute findings     #CV:  //HTN emergency 2/2 medication noncompliance  - stopped htn meds for the last month only using herbal remedies. Supposed to be on lisinopril, hydrochlorotiazide, carvedilol at home.   - BP w/ EMS arrival was 240/120--> in /180 s/p 10mg hydralazine IVP   - Will hold off on nitro gtt at this time as pts last BP reading was 190/127  - Will allow for slow BP correction in the next 24 hours with GOAL systolic of 180-190  - Will hold all PO antihypertensive at this time and add back as needed     //NSTEMI  - Troponin elevated: 3517.4, will continue to trend cardiac enzymes   - EKG with diffused T-wave inversions   - W/ chest pain on admission, s/p 3 sublingual nitro w/ EMS, CP now resolved, will continue to monitor   - ? prior MI: CT chest w/ "Curvilinear hypoattenuation along theleft ventricular apex, likely sequela of prior myocardial infarction."  - Cardiology consult placed, will appreciate recs   - No known hx of HF, had prior stress test in april 2024 unknown results  - F/u official TTE  - ASA, statin     #GI:  -Diet: PO   - CT abd/pelvis with no dissection     #Renal:  //MOOSE  -Baseline has solitary right kidney  - Unknown baseline renal fxn, on admission cr 1.53  - Continue to trend renal fxn, avoid nephrotoxic agents   - cont to monitor strict I/Os, voiding freely  - replete lytes as appropriate   - Will need renal eval once stable     #ID:  - afebrile, wbc nl  - cont to monitor off abx     #Heme:  //DVT ppx: HSQ  - cbc stable    #Endo:  - maintaining goal glucose<180 with ISS  - cont to monitor FS    #Dispo:  - Admit to ICU for closer blood pressure monitoring     Case discussed with ICU attending, Dr. Short

## 2024-07-26 NOTE — H&P ADULT - NS ATTEND AMEND GEN_ALL_CORE FT
55 yo F hx HTN, solitary kidney, depression and asthma p/w htn emergency and nstemi and MOOSE. Pt  w/ peak . Pt reports that she been noncompliant with medications for a month and has been drinking "bush tea" instead - she is unsure all the ingredients. Pt p/w CP, HA and SOB. The CP is typical but not as significant as the prior anginal pain she has had before. No CP at this time. Her HA and SOB have also resolved now     Unfortunately she reports that her baseline SBP is 190s- she was again counselled on how this is very damaging to her heart, brain and kidneys which she is aware of and will try to take her medications in future.    - cont neuro checks, no deficits now   - CTh negative for acute dz  - as above BP already has corrected by appropriate amount in the immediate period and will maintain slow correction over the past 24 h to prevent overcorrection and cerebral ischemia   - cont trend trop, no active CP  - appreciate Cardio reccs  - cont asa and statin as tolerated - no a/c however  - f/u echo  - monitor uo and lytes for MOOSE, check UA  - will need Renal eval once stable  - no resp complaints currently and no wheezing  - dvt ppx

## 2024-07-26 NOTE — PATIENT PROFILE ADULT - FALL HARM RISK - RISK INTERVENTIONS
Assistance OOB with selected safe patient handling equipment/Communicate Fall Risk and Risk Factors to all staff, patient, and family/Discuss with provider need for PT consult/Monitor gait and stability/Provide patient with walking aids - walker, cane, crutches/Reinforce activity limits and safety measures with patient and family/Visual Cue: Yellow wristband/Bed in lowest position, wheels locked, appropriate side rails in place/Call bell, personal items and telephone in reach/Instruct patient to call for assistance before getting out of bed or chair/Non-slip footwear when patient is out of bed/[place] to call system/Physically safe environment - no spills, clutter or unnecessary equipment/Purposeful Proactive Rounding/Room/bathroom lighting operational, light cord in reach Assistance OOB with selected safe patient handling equipment/Assistance with ambulation/Communicate Fall Risk and Risk Factors to all staff, patient, and family/Discuss with provider need for PT consult/Monitor gait and stability/Provide patient with walking aids - walker, cane, crutches/Reinforce activity limits and safety measures with patient and family/Visual Cue: Yellow wristband/Bed in lowest position, wheels locked, appropriate side rails in place/Call bell, personal items and telephone in reach/Instruct patient to call for assistance before getting out of bed or chair/Non-slip footwear when patient is out of bed/[place] to call system/Physically safe environment - no spills, clutter or unnecessary equipment/Purposeful Proactive Rounding/Room/bathroom lighting operational, light cord in reach

## 2024-07-26 NOTE — ED PROVIDER NOTE - PROGRESS NOTE DETAILS
pt given morphine for her headache, her headache  has subsided ICU called for consult cardiologist Dr La is in the ER to evaluste the patient cardiologist Dr La is in the ER to evaluste the patient  pt is pain free, her chest pain and headache resolved

## 2024-07-26 NOTE — PROVIDER CONTACT NOTE (EICU) - BACKGROUND
54F with HTN, congenital single kidney p/w HA blurry vison, found to have htn emergency, bp on arrival 229/141.
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
## 2024-07-27 LAB
ANION GAP SERPL CALC-SCNC: 6 MMOL/L — SIGNIFICANT CHANGE UP (ref 5–17)
BUN SERPL-MCNC: 17 MG/DL — SIGNIFICANT CHANGE UP (ref 7–23)
CALCIUM SERPL-MCNC: 10 MG/DL — SIGNIFICANT CHANGE UP (ref 8.5–10.1)
CHLORIDE SERPL-SCNC: 108 MMOL/L — SIGNIFICANT CHANGE UP (ref 96–108)
CK MB BLD-MCNC: 1.8 % — SIGNIFICANT CHANGE UP (ref 0–3.5)
CK MB CFR SERPL CALC: 1.6 NG/ML — SIGNIFICANT CHANGE UP (ref 0.5–3.6)
CK SERPL-CCNC: 89 U/L — SIGNIFICANT CHANGE UP (ref 26–192)
CO2 SERPL-SCNC: 26 MMOL/L — SIGNIFICANT CHANGE UP (ref 22–31)
CREAT SERPL-MCNC: 1.63 MG/DL — HIGH (ref 0.5–1.3)
EGFR: 37 ML/MIN/1.73M2 — LOW
GLUCOSE SERPL-MCNC: 129 MG/DL — HIGH (ref 70–99)
HCT VFR BLD CALC: 36 % — SIGNIFICANT CHANGE UP (ref 34.5–45)
HGB BLD-MCNC: 11.3 G/DL — LOW (ref 11.5–15.5)
MAGNESIUM SERPL-MCNC: 2 MG/DL — SIGNIFICANT CHANGE UP (ref 1.6–2.6)
MCHC RBC-ENTMCNC: 24.5 PG — LOW (ref 27–34)
MCHC RBC-ENTMCNC: 31.4 G/DL — LOW (ref 32–36)
MCV RBC AUTO: 78.1 FL — LOW (ref 80–100)
NRBC # BLD: 0 /100 WBCS — SIGNIFICANT CHANGE UP (ref 0–0)
PHOSPHATE SERPL-MCNC: 3.2 MG/DL — SIGNIFICANT CHANGE UP (ref 2.5–4.5)
PLATELET # BLD AUTO: 387 K/UL — SIGNIFICANT CHANGE UP (ref 150–400)
POTASSIUM SERPL-MCNC: 4.1 MMOL/L — SIGNIFICANT CHANGE UP (ref 3.5–5.3)
POTASSIUM SERPL-SCNC: 4.1 MMOL/L — SIGNIFICANT CHANGE UP (ref 3.5–5.3)
RBC # BLD: 4.61 M/UL — SIGNIFICANT CHANGE UP (ref 3.8–5.2)
RBC # FLD: 15.5 % — HIGH (ref 10.3–14.5)
SODIUM SERPL-SCNC: 140 MMOL/L — SIGNIFICANT CHANGE UP (ref 135–145)
TROPONIN I, HIGH SENSITIVITY RESULT: 3590.2 NG/L — HIGH
WBC # BLD: 9.63 K/UL — SIGNIFICANT CHANGE UP (ref 3.8–10.5)
WBC # FLD AUTO: 9.63 K/UL — SIGNIFICANT CHANGE UP (ref 3.8–10.5)

## 2024-07-27 PROCEDURE — 99233 SBSQ HOSP IP/OBS HIGH 50: CPT

## 2024-07-27 PROCEDURE — 93010 ELECTROCARDIOGRAM REPORT: CPT

## 2024-07-27 RX ORDER — CARVEDILOL PHOSPHATE 80 MG
3.12 CAPSULE,EXTENDED RELEASE MULTIPHASE 24HR ORAL EVERY 12 HOURS
Refills: 0 | Status: DISCONTINUED | OUTPATIENT
Start: 2024-07-27 | End: 2024-07-28

## 2024-07-27 RX ORDER — HYDRALAZINE HYDROCHLORIDE 100 MG/1
5 TABLET ORAL ONCE
Refills: 0 | Status: COMPLETED | OUTPATIENT
Start: 2024-07-27 | End: 2024-07-27

## 2024-07-27 RX ORDER — CARVEDILOL PHOSPHATE 80 MG
6.25 CAPSULE,EXTENDED RELEASE MULTIPHASE 24HR ORAL EVERY 12 HOURS
Refills: 0 | Status: DISCONTINUED | OUTPATIENT
Start: 2024-07-27 | End: 2024-07-27

## 2024-07-27 RX ORDER — AMLODIPINE BESYLATE 2.5 MG/1
10 TABLET ORAL DAILY
Refills: 0 | Status: DISCONTINUED | OUTPATIENT
Start: 2024-07-27 | End: 2024-07-29

## 2024-07-27 RX ORDER — HYDRALAZINE HYDROCHLORIDE 100 MG/1
5 TABLET ORAL EVERY 6 HOURS
Refills: 0 | Status: DISCONTINUED | OUTPATIENT
Start: 2024-07-27 | End: 2024-07-29

## 2024-07-27 RX ADMIN — AMLODIPINE BESYLATE 10 MILLIGRAM(S): 2.5 TABLET ORAL at 20:11

## 2024-07-27 RX ADMIN — HYDRALAZINE HYDROCHLORIDE 5 MILLIGRAM(S): 100 TABLET ORAL at 20:11

## 2024-07-27 RX ADMIN — HEPARIN SODIUM 5000 UNIT(S): 1000 INJECTION, SOLUTION INTRAVENOUS; SUBCUTANEOUS at 05:36

## 2024-07-27 RX ADMIN — HEPARIN SODIUM 5000 UNIT(S): 1000 INJECTION, SOLUTION INTRAVENOUS; SUBCUTANEOUS at 17:31

## 2024-07-27 RX ADMIN — Medication 3.12 MILLIGRAM(S): at 17:31

## 2024-07-27 RX ADMIN — Medication 6.25 MILLIGRAM(S): at 08:12

## 2024-07-27 RX ADMIN — ATORVASTATIN CALCIUM 40 MILLIGRAM(S): 40 TABLET, FILM COATED ORAL at 21:42

## 2024-07-27 RX ADMIN — Medication 81 MILLIGRAM(S): at 13:21

## 2024-07-27 RX ADMIN — CHLORHEXIDINE GLUCONATE 1 APPLICATION(S): 500 CLOTH TOPICAL at 03:30

## 2024-07-27 NOTE — PROGRESS NOTE ADULT - SUBJECTIVE AND OBJECTIVE BOX
Patient is a 54y old  Female who presents with a chief complaint of Hypertensive emergency, NSTEMI (27 Jul 2024 11:28)      BRIEF HOSPITAL COURSE: 54 year old female with a PMHX HTN noncompliant with medications, angina, renal agenesis, asthma (albuterol at home), depression, and anxiety presents to the [facility] EMS w/ headache, nausea, and vomiting. Pt states she was visiting a friend today and went up 4 steps when she suddenly developed chest pain, severe headache, N/V. On arrival EMS found BP to be 240/120, pt was given 3 so sublingual nitro. Pt has stopped taking all her anti-hypertensive meds for the last month and started taking herbal teas instead (bush and garlic tea). Reports baseline BP at home is around 180-190. Taken to the ICU for control of BP.    Events last 24 hours: Restarted on home dose coreg. Intermittent chest pain throughout the day that seems to subside on its own, there also seems to be a correlation with BP as well ( elevated BP she gets chest pain, when BP lowered it improves). She complained of some chest pain tonight, 5/10 with radiation to shoulders. BP was elevated at that time treated wit hydralazine 5mg IVP with improvement in chest pain and BP. She admits to not taking her home HTN medication for at least 1 month.     PAST MEDICAL & SURGICAL HISTORY:  Essential hypertension      No significant past surgical history          Review of Systems:  CONSTITUTIONAL: No fever, chills, or fatigue  EYES: No eye pain, visual disturbances, or discharge  ENMT:  No difficulty hearing, tinnitus, vertigo; No sinus or throat pain  NECK: No pain or stiffness  RESPIRATORY: No cough, wheezing, chills or hemoptysis; No shortness of breath  CARDIOVASCULAR: + chest pain, No palpitations, dizziness, or leg swelling  GASTROINTESTINAL: No abdominal or epigastric pain. No nausea, vomiting, or hematemesis; No diarrhea or constipation. No melena or hematochezia.  GENITOURINARY: No dysuria, frequency, hematuria, or incontinence  NEUROLOGICAL: No headaches, memory loss, loss of strength, numbness, or tremors  SKIN: No itching, burning, rashes, or lesions   MUSCULOSKELETAL: No joint pain or swelling; No muscle, back, or extremity pain  PSYCHIATRIC: No depression, anxiety, mood swings, or difficulty sleeping      Medications:    amLODIPine   Tablet 10 milliGRAM(s) Oral daily  carvedilol 3.125 milliGRAM(s) Oral every 12 hours  hydrALAZINE Injectable 5 milliGRAM(s) IV Push every 6 hours PRN          aspirin  chewable 81 milliGRAM(s) Oral daily  heparin   Injectable 5000 Unit(s) SubCutaneous every 12 hours        atorvastatin 40 milliGRAM(s) Oral at bedtime        chlorhexidine 2% Cloths 1 Application(s) Topical <User Schedule>            ICU Vital Signs Last 24 Hrs  T(C): 36.7 (27 Jul 2024 19:43), Max: 36.8 (27 Jul 2024 06:00)  T(F): 98 (27 Jul 2024 19:43), Max: 98.3 (27 Jul 2024 06:00)  HR: 84 (28 Jul 2024 00:00) (61 - 90)  BP: 144/104 (28 Jul 2024 00:00) (113/74 - 209/120)  BP(mean): 118 (28 Jul 2024 00:00) (87 - 147)  ABP: --  ABP(mean): --  RR: 17 (28 Jul 2024 00:00) (10 - 23)  SpO2: 97% (28 Jul 2024 00:00) (82% - 100%)    O2 Parameters below as of 28 Jul 2024 00:00  Patient On (Oxygen Delivery Method): room air                I&O's Detail    26 Jul 2024 07:01  -  27 Jul 2024 07:00  --------------------------------------------------------  IN:    Oral Fluid: 150 mL  Total IN: 150 mL    OUT:    Voided (mL): 800 mL  Total OUT: 800 mL    Total NET: -650 mL      27 Jul 2024 07:01  -  28 Jul 2024 00:32  --------------------------------------------------------  IN:  Total IN: 0 mL    OUT:    Voided (mL): 450 mL  Total OUT: 450 mL    Total NET: -450 mL            LABS:                        11.3   9.63  )-----------( 387      ( 27 Jul 2024 02:30 )             36.0     07-27    140  |  108  |  17  ----------------------------<  129<H>  4.1   |  26  |  1.63<H>    Ca    10.0      27 Jul 2024 02:30  Phos  3.2     07-27  Mg     2.0     07-27    TPro  9.4<H>  /  Alb  4.3  /  TBili  0.6  /  DBili  x   /  AST  27  /  ALT  17  /  AlkPhos  106  07-26      CARDIAC MARKERS ( 27 Jul 2024 02:30 )  x     / x     / 89 U/L / x     / 1.6 ng/mL  CARDIAC MARKERS ( 26 Jul 2024 19:15 )  x     / x     / 132 U/L / x     / 1.9 ng/mL  CARDIAC MARKERS ( 26 Jul 2024 12:10 )  x     / x     / 207 U/L / x     / x          CAPILLARY BLOOD GLUCOSE      POCT Blood Glucose.: 98 mg/dL (26 Jul 2024 11:30)    PT/INR - ( 26 Jul 2024 13:10 )   PT: 13.0 sec;   INR: 1.10 ratio         PTT - ( 26 Jul 2024 13:10 )  PTT:25.4 sec  Urinalysis Basic - ( 27 Jul 2024 02:30 )    Color: x / Appearance: x / SG: x / pH: x  Gluc: 129 mg/dL / Ketone: x  / Bili: x / Urobili: x   Blood: x / Protein: x / Nitrite: x   Leuk Esterase: x / RBC: x / WBC x   Sq Epi: x / Non Sq Epi: x / Bacteria: x      CULTURES:      Physical Examination:    General: Alert, oriented, interactive, nonfocal    HEENT: Pupils equal, reactive to light.  Symmetric.    PULM: Clear to auscultation bilaterally    CVS: Regular rate and rhythm    ABD: Soft, nondistended, nontender    EXT: No edema, nontender    SKIN: Warm    NEURO: A&Ox3, strength 5/5 all extremities,  no focal deficits      RADIOLOGY: Multiple EKGs with TWI in 2,3,aVF, V4-V6    " Time spent on this patient encounter, which includes documenting this note in the electronic medical record, was 42 minutes including assessing the presenting problems with associated risks, reviewing the medical record to prepare for the encounter, and meeting face to face with patient to obtain additional history. I have also performed an appropriate physical exam, made interventions listed and ordered and interpreted appropriate diagnostic studies as documented. To improve communication and patient saftey, I have coordinated care with the multidisciplinary team including the bedside nurse, appropriate attending of record and consultants as needed. "    Date of Entry of this note is equal to the date of services rendered Purse String (Simple) Text: Given the location of the defect and the characteristics of the surrounding skin a purse string simple closure was deemed most appropriate.  Undermining was performed circumferentially around the surgical defect.  A purse string suture was then placed and tightened.

## 2024-07-27 NOTE — PROGRESS NOTE ADULT - ASSESSMENT
Pt is a 55 yo BF with h/o asthma (Albuterol), angina, HTN noncompliant with meds (pt has stopped taking all her anti-hypertensive meds for the last month and started taking herbal teas instead (bush and garlic tea) and reports baseline SBP at home is around 180-190), renal agenesis, depression, and anxiety BIBEMS 2 to CP, HA, nausea and dizziness. In the ER /141 with (+) increasing trop, EKG infero-lateral inverted T waves and Cr 1.53. ICU dx: HTN emergency with NSTEMI and MOOSE    CVS: Resume pt's Coreg; may need to decrease the dose/ Pt with elevated trop but neg MB% ( ? MI was days ago)/ F/u as per Cardio  Heme: Cont Asa, Statin, ? anti-platelet and AC  FEN: Cardiac prudent diet  Renal: Follow BUN/Cr  Social: Pt is full code

## 2024-07-27 NOTE — PROGRESS NOTE ADULT - ASSESSMENT
Problem List:  1) Hypertension  2) r/o NSTEMI    Restarted on home dose coreg  Bp responded to hydralazine 5mg IVP, will make PRN  She also takes amlodipine 10mg and lisinopril 40mg at home.   Will restart amlodipine now. If BP continues to spike can add lisinopril.   Cardiology evaluation noted. EKG changes, with chest pain, and elevated BP is concerning for NSTEMI  ASA 81mg, BB, statin on board  If continues to have worsening chest pain with elevated BP, will consider started nitro infusion to help with BP and chest pain.

## 2024-07-27 NOTE — PROGRESS NOTE ADULT - SUBJECTIVE AND OBJECTIVE BOX
HPI:  Pt is a 53 yo BF with h/o asthma (Albuterol), angina, HTN noncompliant with meds (pt has stopped taking all her anti-hypertensive meds for the last month and started taking herbal teas instead (bush and garlic tea) and reports baseline SBP at home is around 180-190), renal agenesis, depression, and anxiety BIBEMS 2 to CP, HA, nausea and dizziness. In the ER /141 with (+) increasing trop, EKG infero-lateral inverted T waves and Cr 1.53. ICU dx: HTN emergency with NSTEMI and MOOSE      ## Labs:  CBC:                        11.3   9.63  )-----------( 387      ( 2024 02:30 )             36.0     Chem:      140  |  108  |  17  ----------------------------<  129<H>  4.1   |  26  |  1.63<H>    Ca    10.0      2024 02:30  Phos  3.2       Mg     2.0         TPro  9.4<H>  /  Alb  4.3  /  TBili  0.6  /  DBili  x   /  AST    /  ALT  17  /  AlkPhos  106      Coags:  PT/INR - ( 2024 13:10 )   PT: 13.0 sec;   INR: 1.10 ratio         PTT - ( 2024 13:10 )  PTT:25.4 sec        ## Imaging:    ## Medications:    carvedilol 6.25 milliGRAM(s) Oral every 12 hours      atorvastatin 40 milliGRAM(s) Oral at bedtime    aspirin  chewable 81 milliGRAM(s) Oral daily  heparin   Injectable 5000 Unit(s) SubCutaneous every 12 hours          ## Vitals:  T(C): 36.2 (24 @ 11:00), Max: 36.8 (24 @ 23:48)  HR: 71 (24 @ 11:00) (61 - 94)  BP: 122/85 (24 @ 11:00) (113/74 - 231/135)  BP(mean): 97 (24 @ 11:00) (87 - 206)  RR: 13 (24 @ 11:00) (10 - 26)  SpO2: 99% (24 @ 11:00) (94% - 100%)  Wt(kg): --  Vent:   AB-26 @ 07:01  -   @ 07:00  --------------------------------------------------------  IN: 150 mL / OUT: 800 mL / NET: -650 mL     @ 07:01  -   @ 11:31  --------------------------------------------------------  IN: 0 mL / OUT: 250 mL / NET: -250 mL          ## P/E:  Gen: lying comfortably in bed in no apparent distress  Lungs: CTA  Heart: RRR  Abd: Soft/+BS/ Non-tender  Ext: No edema  Neuro: AAO x3    CENTRAL LINE: [ ] YES [ ] NO  LOCATION:   DATE INSERTED:  REMOVE: [ ] YES [ ] NO      ZEN: [ ] YES [ ] NO    DATE INSERTED:  REMOVE:  [ ] YES [ ] NO      A-LINE:  [ ] YES [ ] NO  LOCATION:   DATE INSERTED:  REMOVE:  [ ] YES [ ] NO  EXPLAIN:      CODE STATUS: [x ] full code  [ ] DNR  [ ] DNI  [ ] [facility]  Goals of care discussion: [ ] yes

## 2024-07-28 LAB
ALBUMIN SERPL ELPH-MCNC: 3 G/DL — LOW (ref 3.3–5)
ALP SERPL-CCNC: 82 U/L — SIGNIFICANT CHANGE UP (ref 40–120)
ALT FLD-CCNC: 11 U/L — LOW (ref 12–78)
ANION GAP SERPL CALC-SCNC: 7 MMOL/L — SIGNIFICANT CHANGE UP (ref 5–17)
AST SERPL-CCNC: 16 U/L — SIGNIFICANT CHANGE UP (ref 15–37)
BASOPHILS # BLD AUTO: 0.03 K/UL — SIGNIFICANT CHANGE UP (ref 0–0.2)
BASOPHILS NFR BLD AUTO: 0.4 % — SIGNIFICANT CHANGE UP (ref 0–2)
BILIRUB SERPL-MCNC: 0.4 MG/DL — SIGNIFICANT CHANGE UP (ref 0.2–1.2)
BUN SERPL-MCNC: 22 MG/DL — SIGNIFICANT CHANGE UP (ref 7–23)
CALCIUM SERPL-MCNC: 9.3 MG/DL — SIGNIFICANT CHANGE UP (ref 8.5–10.1)
CHLORIDE SERPL-SCNC: 108 MMOL/L — SIGNIFICANT CHANGE UP (ref 96–108)
CO2 SERPL-SCNC: 23 MMOL/L — SIGNIFICANT CHANGE UP (ref 22–31)
CREAT SERPL-MCNC: 1.75 MG/DL — HIGH (ref 0.5–1.3)
EGFR: 34 ML/MIN/1.73M2 — LOW
EOSINOPHIL # BLD AUTO: 0.34 K/UL — SIGNIFICANT CHANGE UP (ref 0–0.5)
EOSINOPHIL NFR BLD AUTO: 4.4 % — SIGNIFICANT CHANGE UP (ref 0–6)
GLUCOSE SERPL-MCNC: 108 MG/DL — HIGH (ref 70–99)
HCT VFR BLD CALC: 33.6 % — LOW (ref 34.5–45)
HGB BLD-MCNC: 10.7 G/DL — LOW (ref 11.5–15.5)
IMM GRANULOCYTES NFR BLD AUTO: 0.3 % — SIGNIFICANT CHANGE UP (ref 0–0.9)
LYMPHOCYTES # BLD AUTO: 3.41 K/UL — HIGH (ref 1–3.3)
LYMPHOCYTES # BLD AUTO: 44.2 % — HIGH (ref 13–44)
MAGNESIUM SERPL-MCNC: 1.9 MG/DL — SIGNIFICANT CHANGE UP (ref 1.6–2.6)
MCHC RBC-ENTMCNC: 24.6 PG — LOW (ref 27–34)
MCHC RBC-ENTMCNC: 31.8 G/DL — LOW (ref 32–36)
MCV RBC AUTO: 77.2 FL — LOW (ref 80–100)
MONOCYTES # BLD AUTO: 0.63 K/UL — SIGNIFICANT CHANGE UP (ref 0–0.9)
MONOCYTES NFR BLD AUTO: 8.2 % — SIGNIFICANT CHANGE UP (ref 2–14)
NEUTROPHILS # BLD AUTO: 3.28 K/UL — SIGNIFICANT CHANGE UP (ref 1.8–7.4)
NEUTROPHILS NFR BLD AUTO: 42.5 % — LOW (ref 43–77)
NRBC # BLD: 0 /100 WBCS — SIGNIFICANT CHANGE UP (ref 0–0)
PHOSPHATE SERPL-MCNC: 3.8 MG/DL — SIGNIFICANT CHANGE UP (ref 2.5–4.5)
PLATELET # BLD AUTO: 396 K/UL — SIGNIFICANT CHANGE UP (ref 150–400)
POTASSIUM SERPL-MCNC: 3.4 MMOL/L — LOW (ref 3.5–5.3)
POTASSIUM SERPL-SCNC: 3.4 MMOL/L — LOW (ref 3.5–5.3)
PROT SERPL-MCNC: 7 GM/DL — SIGNIFICANT CHANGE UP (ref 6–8.3)
RBC # BLD: 4.35 M/UL — SIGNIFICANT CHANGE UP (ref 3.8–5.2)
RBC # FLD: 15.8 % — HIGH (ref 10.3–14.5)
SODIUM SERPL-SCNC: 138 MMOL/L — SIGNIFICANT CHANGE UP (ref 135–145)
TROPONIN I, HIGH SENSITIVITY RESULT: 3005 NG/L — HIGH
WBC # BLD: 7.71 K/UL — SIGNIFICANT CHANGE UP (ref 3.8–10.5)
WBC # FLD AUTO: 7.71 K/UL — SIGNIFICANT CHANGE UP (ref 3.8–10.5)

## 2024-07-28 PROCEDURE — 93010 ELECTROCARDIOGRAM REPORT: CPT

## 2024-07-28 PROCEDURE — 99233 SBSQ HOSP IP/OBS HIGH 50: CPT

## 2024-07-28 RX ORDER — LABETALOL HCL 200 MG
200 TABLET ORAL
Refills: 0 | Status: DISCONTINUED | OUTPATIENT
Start: 2024-07-28 | End: 2024-07-29

## 2024-07-28 RX ORDER — POTASSIUM CHLORIDE 1500 MG/1
40 TABLET, EXTENDED RELEASE ORAL ONCE
Refills: 0 | Status: COMPLETED | OUTPATIENT
Start: 2024-07-28 | End: 2024-07-28

## 2024-07-28 RX ADMIN — CHLORHEXIDINE GLUCONATE 1 APPLICATION(S): 500 CLOTH TOPICAL at 06:10

## 2024-07-28 RX ADMIN — POTASSIUM CHLORIDE 40 MILLIEQUIVALENT(S): 1500 TABLET, EXTENDED RELEASE ORAL at 06:10

## 2024-07-28 RX ADMIN — Medication 200 MILLIGRAM(S): at 17:08

## 2024-07-28 RX ADMIN — Medication 81 MILLIGRAM(S): at 11:17

## 2024-07-28 RX ADMIN — HEPARIN SODIUM 5000 UNIT(S): 1000 INJECTION, SOLUTION INTRAVENOUS; SUBCUTANEOUS at 06:10

## 2024-07-28 RX ADMIN — ATORVASTATIN CALCIUM 40 MILLIGRAM(S): 40 TABLET, FILM COATED ORAL at 21:13

## 2024-07-28 RX ADMIN — Medication 3.12 MILLIGRAM(S): at 06:10

## 2024-07-28 RX ADMIN — HEPARIN SODIUM 5000 UNIT(S): 1000 INJECTION, SOLUTION INTRAVENOUS; SUBCUTANEOUS at 17:08

## 2024-07-28 NOTE — PROGRESS NOTE ADULT - SUBJECTIVE AND OBJECTIVE BOX
HPI:  54 year old female with a PMHX HTN noncompliant with medications, angina, renal agenesis, asthma (albuterol at home), depression, and anxiety presents to the [facility] EMS w/ headache, nausea, and vomiting. Pt states she was visiting a friend today and went up 4 steps when she suddenly developed chest pain, severe headache, N/V. On arrival EMS found BP to be 240/120, pt was given 3 so sublingual nitro. Pt has stopped taking all her anti-hypertensive meds for the last month and started taking herbal teas instead (bush and garlic tea). Reports baseline BP at home is around 180-190. Describes chest pain as pressure sensation. Denies chills, fever, dizziness, abdominal pain.     In ED pt with received morphine with resolution of chest pain and headache. S/p aspirin and 10mg hydralazine. CT head/chest/abd/pelvis with no acute findings. Labs remarkable for troponins 3517.4, CK; 207, BNP: 330, Cr; 1.53. Admit to ICU for further management.  (2024 15:10)      24 hr events:      ## Labs:  CBC:                        10.7   7.71  )-----------( 396      ( 2024 03:45 )             33.6     Chem:      138  |  108  |  22  ----------------------------<  108<H>  3.4<L>   |  23  |  1.75<H>    Ca    9.3      2024 03:45  Phos  3.8       Mg     1.9         TPro  7.0  /  Alb  3.0<L>  /  TBili  0.4  /  DBili  x   /  AST  16  /  ALT  11<L>  /  AlkPhos  82      Coags:          ## Imaging:    ## Medications:    amLODIPine   Tablet 10 milliGRAM(s) Oral daily  hydrALAZINE Injectable 5 milliGRAM(s) IV Push every 6 hours PRN  labetalol 200 milliGRAM(s) Oral two times a day      atorvastatin 40 milliGRAM(s) Oral at bedtime    aspirin  chewable 81 milliGRAM(s) Oral daily  heparin   Injectable 5000 Unit(s) SubCutaneous every 12 hours          ## Vitals:  T(C): 36.6 (24 @ 12:00), Max: 36.8 (24 @ 06:00)  HR: 66 (24 @ 15:00) (63 - 86)  BP: 135/82 (24 @ 15:00) (125/93 - 187/114)  BP(mean): 99 (24 @ 15:00) (99 - 136)  RR: 16 (24 @ 15:00) (11 - 23)  SpO2: 97% (24 @ 15:00) (82% - 100%)  Wt(kg): --  Vent:   AB-27 @ 07:  -   @ 07:00  --------------------------------------------------------  IN: 0 mL / OUT: 1200 mL / NET: -1200 mL     @ 07:01  -   @ 15:29  --------------------------------------------------------  IN: 660 mL / OUT: 0 mL / NET: 660 mL          ## P/E:  Gen: lying comfortably in bed in no apparent distress  Lungs: CTA  Heart: RRR  Abd: Soft/+BS/ Non-tender  Ext: No edema  Neuro: AAO x3    CENTRAL LINE: [ ] YES [ ] NO  LOCATION:   DATE INSERTED:  REMOVE: [ ] YES [ ] NO      ZALDIVAR: [ ] YES [ ] NO    DATE INSERTED:  REMOVE:  [ ] YES [ ] NO      A-LINE:  [ ] YES [ ] NO  LOCATION:   DATE INSERTED:  REMOVE:  [ ] YES [ ] NO  EXPLAIN:      CODE STATUS: [x ] full code  [ ] DNR  [ ] DNI  [ ] [facility]  Goals of care discussion: [ ] yes      HPI:  Pt is a 53 yo BF with h/o asthma (Albuterol), angina, HTN noncompliant with meds (pt has stopped taking all her anti-hypertensive meds for the last month and started taking herbal teas instead (bush and garlic tea) and reports baseline SBP at home is around 180-190), renal agenesis, depression, and anxiety BIBEMS 2 to CP, HA, nausea and dizziness. In the ER /141 with (+) increasing trop, EKG infero-lateral inverted T waves and Cr 1.53. ICU dx: HTN emergency with NSTEMI and MOOSE      ## Labs:  CBC:                        10.7   7.71  )-----------( 396      ( 2024 03:45 )             33.6     Chem:      138  |  108  |  22  ----------------------------<  108<H>  3.4<L>   |  23  |  1.75<H>    Ca    9.3      2024 03:45  Phos  3.8       Mg     1.9         TPro  7.0  /  Alb  3.0<L>  /  TBili  0.4  /  DBili  x   /  AST  16  /  ALT  11<L>  /  AlkPhos  82      Coags:          ## Imaging:    ## Medications:    amLODIPine   Tablet 10 milliGRAM(s) Oral daily  hydrALAZINE Injectable 5 milliGRAM(s) IV Push every 6 hours PRN  labetalol 200 milliGRAM(s) Oral two times a day      atorvastatin 40 milliGRAM(s) Oral at bedtime    aspirin  chewable 81 milliGRAM(s) Oral daily  heparin   Injectable 5000 Unit(s) SubCutaneous every 12 hours          ## Vitals:  T(C): 36.6 (24 @ 12:00), Max: 36.8 (24 @ 06:00)  HR: 66 (24 @ 15:00) (63 - 86)  BP: 135/82 (24 @ 15:00) (125/93 - 187/114)  BP(mean): 99 (24 @ 15:00) (99 - 136)  RR: 16 (24 @ 15:00) (11 - 23)  SpO2: 97% (24 @ 15:00) (82% - 100%)  Wt(kg): --  Vent:   AB-27 @ 07:01  -   @ 07:00  --------------------------------------------------------  IN: 0 mL / OUT: 1200 mL / NET: -1200 mL     @ 07:  -   @ 15:29  --------------------------------------------------------  IN: 660 mL / OUT: 0 mL / NET: 660 mL          ## P/E:  Gen: lying comfortably in bed in no apparent distress  Lungs: CTA  Heart: RRR  Abd: Soft/+BS/ Non-tender  Ext: No edema  Neuro: AAO x3    CENTRAL LINE: [ ] YES [ ] NO  LOCATION:   DATE INSERTED:  REMOVE: [ ] YES [ ] NO      ZALDIVAR: [ ] YES [ ] NO    DATE INSERTED:  REMOVE:  [ ] YES [ ] NO      A-LINE:  [ ] YES [ ] NO  LOCATION:   DATE INSERTED:  REMOVE:  [ ] YES [ ] NO  EXPLAIN:      CODE STATUS: [x ] full code  [ ] DNR  [ ] DNI  [ ] [facility]  Goals of care discussion: [ ] yes

## 2024-07-28 NOTE — PROGRESS NOTE ADULT - ASSESSMENT
Pt is a 53 yo BF with h/o asthma (Albuterol), angina, HTN noncompliant with meds (pt has stopped taking all her anti-hypertensive meds for the last month and started taking herbal teas instead (bush and garlic tea) and reports baseline SBP at home is around 180-190), renal agenesis, depression, and anxiety BIBEMS 2 to CP, HA, nausea and dizziness. In the ER /141 with (+) increasing trop, EKG infero-lateral inverted T waves and Cr 1.53. ICU dx: HTN emergency with NSTEMI and MOOSE    CVS: Pt with elevated trop but neg MB% ( ? MI was days ago)/ F/u as per Cardio: Change Coreg to Labetalol/ Cont Asa + Statin  Heme: DVT prophylaxis wit sq Heparin  FEN: Cardiac prudent diet  Renal: Follow BUN/Cr  Social: Pt is full code   Pt is a 53 yo BF with h/o asthma (Albuterol), angina, HTN noncompliant with meds (pt has stopped taking all her anti-hypertensive meds for the last month and started taking herbal teas instead (bush and garlic tea) and reports baseline SBP at home is around 180-190), renal agenesis, depression, and anxiety BIBEMS 2 to CP, HA, nausea and dizziness. In the ER /141 with (+) increasing trop, EKG infero-lateral inverted T waves and Cr 1.53. ICU dx: HTN emergency with NSTEMI and MOOSE    CVS: Pt with elevated trop but neg MB% ( ? MI was days ago)/ F/u as per Cardio: Change Coreg to Labetalol/ Cont Asa + Statin  Heme: DVT prophylaxis wit sq Heparin  FEN: Cardiac prudent diet  Renal: Follow BUN/Cr  Social: Pt is full code/ OOB->chair and may ambulate/ Possible transfer to [facility]

## 2024-07-28 NOTE — PROGRESS NOTE ADULT - SUBJECTIVE AND OBJECTIVE BOX
Reports occasional CP, usually when BP high.    Medications:  amLODIPine   Tablet 10 milliGRAM(s) Oral daily  aspirin  chewable 81 milliGRAM(s) Oral daily  atorvastatin 40 milliGRAM(s) Oral at bedtime  chlorhexidine 2% Cloths 1 Application(s) Topical <User Schedule>  heparin   Injectable 5000 Unit(s) SubCutaneous every 12 hours  hydrALAZINE Injectable 5 milliGRAM(s) IV Push every 6 hours PRN  labetalol 200 milliGRAM(s) Oral two times a day      Vitals:  Vital Signs Last 24 Hrs  T(C): 36.6 (2024 12:00), Max: 36.8 (2024 06:00)  T(F): 97.8 (2024 12:00), Max: 98.3 (2024 06:00)  HR: 68 (2024 12:00) (63 - 86)  BP: 164/95 (2024 12:00) (122/83 - 187/114)  BP(mean): 113 (2024 12:00) (96 - 136)  RR: 15 (2024 12:00) (11 - 23)  SpO2: 100% (2024 12:00) (82% - 100%)    Parameters below as of 2024 08:00  Patient On (Oxygen Delivery Method): room air        Daily     Daily Weight in k (2024 06:00)    I&O's Detail    2024 07:01  -  2024 07:00  --------------------------------------------------------  IN:  Total IN: 0 mL    OUT:    Voided (mL): 1200 mL  Total OUT: 1200 mL    Total NET: -1200 mL      2024 07:01  -  2024 13:25  --------------------------------------------------------  IN:    Oral Fluid: 300 mL  Total IN: 300 mL    OUT:  Total OUT: 0 mL    Total NET: 300 mL          Physical Exam:     General: No distress  Neck: Neck supple. JVP not elevated. No carotid bruits  Chest: Scattered crackles (atelectasis?)  CV: RRR, no m/r/g  Abdomen: Soft, non-distended, non-tender  Psych: Affect flat    Labs:                        10.7   7.71  )-----------( 396      ( 2024 03:45 )             33.6         138  |  108  |  22  ----------------------------<  108<H>  3.4<L>   |  23  |  1.75<H>    Ca    9.3      2024 03:45  Phos  3.8       Mg     1.9         TPro  7.0  /  Alb  3.0<L>  /  TBili  0.4  /  DBili  x   /  AST  16  /  ALT  11<L>  /  AlkPhos  82        CARDIAC MARKERS ( 2024 02:30 )  x     / x     / 89 U/L / x     / 1.6 ng/mL  CARDIAC MARKERS ( 2024 19:15 )  x     / x     / 132 U/L / x     / 1.9 ng/mL

## 2024-07-28 NOTE — PROGRESS NOTE ADULT - ASSESSMENT
54F with HTN, congenital single kidney, p/w HTN emergency.  Elevated trop, nl CPK and NTproBNP. Doubt ACS.   No aortic dissection.    -Change Coreg to labetalol 200 mg po bid and uptitrate, aiming for BP~130/80.    Please call with questions.

## 2024-07-29 ENCOUNTER — TRANSCRIPTION ENCOUNTER (OUTPATIENT)
Age: 54
End: 2024-07-29

## 2024-07-29 VITALS
SYSTOLIC BLOOD PRESSURE: 145 MMHG | OXYGEN SATURATION: 98 % | RESPIRATION RATE: 18 BRPM | HEART RATE: 76 BPM | DIASTOLIC BLOOD PRESSURE: 95 MMHG

## 2024-07-29 LAB
A1C WITH ESTIMATED AVERAGE GLUCOSE RESULT: 5.5 % — SIGNIFICANT CHANGE UP (ref 4–5.6)
ALBUMIN SERPL ELPH-MCNC: 2.9 G/DL — LOW (ref 3.3–5)
ALP SERPL-CCNC: 75 U/L — SIGNIFICANT CHANGE UP (ref 40–120)
ALT FLD-CCNC: 15 U/L — SIGNIFICANT CHANGE UP (ref 12–78)
ANION GAP SERPL CALC-SCNC: 9 MMOL/L — SIGNIFICANT CHANGE UP (ref 5–17)
AST SERPL-CCNC: 13 U/L — LOW (ref 15–37)
BILIRUB SERPL-MCNC: 0.5 MG/DL — SIGNIFICANT CHANGE UP (ref 0.2–1.2)
BUN SERPL-MCNC: 22 MG/DL — SIGNIFICANT CHANGE UP (ref 7–23)
CALCIUM SERPL-MCNC: 9.5 MG/DL — SIGNIFICANT CHANGE UP (ref 8.5–10.1)
CHLORIDE SERPL-SCNC: 108 MMOL/L — SIGNIFICANT CHANGE UP (ref 96–108)
CHOLEST SERPL-MCNC: 204 MG/DL — HIGH
CO2 SERPL-SCNC: 20 MMOL/L — LOW (ref 22–31)
CREAT SERPL-MCNC: 1.53 MG/DL — HIGH (ref 0.5–1.3)
EGFR: 40 ML/MIN/1.73M2 — LOW
ESTIMATED AVERAGE GLUCOSE: 111 MG/DL — SIGNIFICANT CHANGE UP (ref 68–114)
GLUCOSE SERPL-MCNC: 101 MG/DL — HIGH (ref 70–99)
HCT VFR BLD CALC: 31.4 % — LOW (ref 34.5–45)
HDLC SERPL-MCNC: 69 MG/DL — SIGNIFICANT CHANGE UP
HGB BLD-MCNC: 10.2 G/DL — LOW (ref 11.5–15.5)
LIPID PNL WITH DIRECT LDL SERPL: 117 MG/DL — HIGH
MAGNESIUM SERPL-MCNC: 2 MG/DL — SIGNIFICANT CHANGE UP (ref 1.6–2.6)
MCHC RBC-ENTMCNC: 24.9 PG — LOW (ref 27–34)
MCHC RBC-ENTMCNC: 32.5 G/DL — SIGNIFICANT CHANGE UP (ref 32–36)
MCV RBC AUTO: 76.6 FL — LOW (ref 80–100)
NON HDL CHOLESTEROL: 136 MG/DL — HIGH
NRBC # BLD: 0 /100 WBCS — SIGNIFICANT CHANGE UP (ref 0–0)
PHOSPHATE SERPL-MCNC: 4.5 MG/DL — SIGNIFICANT CHANGE UP (ref 2.5–4.5)
PLATELET # BLD AUTO: 308 K/UL — SIGNIFICANT CHANGE UP (ref 150–400)
POTASSIUM SERPL-MCNC: 4 MMOL/L — SIGNIFICANT CHANGE UP (ref 3.5–5.3)
POTASSIUM SERPL-SCNC: 4 MMOL/L — SIGNIFICANT CHANGE UP (ref 3.5–5.3)
PROT SERPL-MCNC: 6.6 GM/DL — SIGNIFICANT CHANGE UP (ref 6–8.3)
RBC # BLD: 4.1 M/UL — SIGNIFICANT CHANGE UP (ref 3.8–5.2)
RBC # FLD: 15.7 % — HIGH (ref 10.3–14.5)
SODIUM SERPL-SCNC: 137 MMOL/L — SIGNIFICANT CHANGE UP (ref 135–145)
T3 SERPL-MCNC: 90 NG/DL — SIGNIFICANT CHANGE UP (ref 80–200)
T4 AB SER-ACNC: 6.4 UG/DL — SIGNIFICANT CHANGE UP (ref 4.6–12)
T4 FREE SERPL-MCNC: 1.1 NG/DL — SIGNIFICANT CHANGE UP (ref 0.9–1.8)
TRIGL SERPL-MCNC: 103 MG/DL — SIGNIFICANT CHANGE UP
TROPONIN I, HIGH SENSITIVITY RESULT: 1941 NG/L — HIGH
TSH SERPL-MCNC: 2.91 UU/ML — SIGNIFICANT CHANGE UP (ref 0.36–3.74)
WBC # BLD: 7 K/UL — SIGNIFICANT CHANGE UP (ref 3.8–10.5)
WBC # FLD AUTO: 7 K/UL — SIGNIFICANT CHANGE UP (ref 3.8–10.5)

## 2024-07-29 PROCEDURE — 99232 SBSQ HOSP IP/OBS MODERATE 35: CPT

## 2024-07-29 PROCEDURE — 99239 HOSP IP/OBS DSCHRG MGMT >30: CPT

## 2024-07-29 RX ORDER — LABETALOL HCL 200 MG
1 TABLET ORAL
Qty: 14 | Refills: 0
Start: 2024-07-29 | End: 2024-08-04

## 2024-07-29 RX ORDER — ASPIRIN 500 MG
1 TABLET ORAL
Qty: 0 | Refills: 0 | DISCHARGE
Start: 2024-07-29

## 2024-07-29 RX ORDER — ATORVASTATIN CALCIUM 40 MG/1
1 TABLET, FILM COATED ORAL
Qty: 7 | Refills: 0
Start: 2024-07-29 | End: 2024-08-04

## 2024-07-29 RX ORDER — ASPIRIN 500 MG
1 TABLET ORAL
Qty: 7 | Refills: 0
Start: 2024-07-29 | End: 2024-08-04

## 2024-07-29 RX ORDER — AMLODIPINE BESYLATE 2.5 MG/1
1 TABLET ORAL
Qty: 7 | Refills: 0
Start: 2024-07-29 | End: 2024-08-04

## 2024-07-29 RX ORDER — LORATADINE 10 MG
17 TABLET,DISINTEGRATING ORAL DAILY
Refills: 0 | Status: DISCONTINUED | OUTPATIENT
Start: 2024-07-29 | End: 2024-07-29

## 2024-07-29 RX ORDER — AMLODIPINE BESYLATE 2.5 MG/1
1 TABLET ORAL
Refills: 0 | DISCHARGE

## 2024-07-29 RX ORDER — LABETALOL HCL 200 MG
1 TABLET ORAL
Qty: 0 | Refills: 0 | DISCHARGE
Start: 2024-07-29

## 2024-07-29 RX ORDER — CARVEDILOL PHOSPHATE 80 MG
1 CAPSULE,EXTENDED RELEASE MULTIPHASE 24HR ORAL
Refills: 0 | DISCHARGE

## 2024-07-29 RX ORDER — LISINOPRIL 10 MG/1
1 TABLET ORAL
Refills: 0 | DISCHARGE

## 2024-07-29 RX ORDER — ATORVASTATIN CALCIUM 40 MG/1
1 TABLET, FILM COATED ORAL
Qty: 0 | Refills: 0 | DISCHARGE
Start: 2024-07-29

## 2024-07-29 RX ORDER — SENNOSIDES 8.6 MG/1
2 TABLET ORAL AT BEDTIME
Refills: 0 | Status: DISCONTINUED | OUTPATIENT
Start: 2024-07-29 | End: 2024-07-29

## 2024-07-29 RX ADMIN — HEPARIN SODIUM 5000 UNIT(S): 1000 INJECTION, SOLUTION INTRAVENOUS; SUBCUTANEOUS at 17:19

## 2024-07-29 RX ADMIN — HEPARIN SODIUM 5000 UNIT(S): 1000 INJECTION, SOLUTION INTRAVENOUS; SUBCUTANEOUS at 05:36

## 2024-07-29 RX ADMIN — Medication 200 MILLIGRAM(S): at 17:19

## 2024-07-29 RX ADMIN — Medication 81 MILLIGRAM(S): at 11:55

## 2024-07-29 RX ADMIN — AMLODIPINE BESYLATE 10 MILLIGRAM(S): 2.5 TABLET ORAL at 05:36

## 2024-07-29 RX ADMIN — CHLORHEXIDINE GLUCONATE 1 APPLICATION(S): 500 CLOTH TOPICAL at 05:42

## 2024-07-29 RX ADMIN — Medication 200 MILLIGRAM(S): at 05:36

## 2024-07-29 NOTE — DIETITIAN INITIAL EVALUATION ADULT - PERTINENT MEDS FT
MEDICATIONS  (STANDING):  amLODIPine   Tablet 10 milliGRAM(s) Oral daily  aspirin  chewable 81 milliGRAM(s) Oral daily  atorvastatin 40 milliGRAM(s) Oral at bedtime  chlorhexidine 2% Cloths 1 Application(s) Topical <User Schedule>  heparin   Injectable 5000 Unit(s) SubCutaneous every 12 hours  labetalol 200 milliGRAM(s) Oral two times a day  polyethylene glycol 3350 17 Gram(s) Oral daily  senna 2 Tablet(s) Oral at bedtime    MEDICATIONS  (PRN):  hydrALAZINE Injectable 5 milliGRAM(s) IV Push every 6 hours PRN Systolic > 180

## 2024-07-29 NOTE — PROGRESS NOTE ADULT - ASSESSMENT
54F with HTN, congenital single kidney, p/w HTN emergency.  Elevated trop, nl CPK and NTproBNP. Doubt ACS.   No aortic dissection.    1) HTN emergency  2) Troponinemia    Recommendations:  -Continue current antihypertensive regimen, appears to be improving; goal BP < 130/80  -Lipid profile, A1c, TSH for risk stratification  -With patient having chest pain earlier this admission, t wave inversions and troponin elevation she warrants ischemic workup  -When ready for discharge please include referral for outpatient cardiology and nephrology. Please include in discharge paperwork   -Maintain lytes at goal  -Stress test tomorrow, NPO after midnight  Discussed with Dr. Betancourt   54F with HTN, congenital single kidney, p/w HTN emergency.  Elevated trop, nl CPK and NTproBNP.  No aortic dissection.    1) HTN emergency  2) Troponinemia    Recommendations:  -Troponin elevation unlikely ACS, non-correlating CKMB, downtrending now. May be demand ischemia in setting of uncontrolled HTN, MOOSE  -Continue current antihypertensive regimen, appears to be improving; goal BP < 130/80  -Lipid profile, A1c, TSH for risk stratification  -Maintain lytes at goal  -With patient having chest pain earlier this admission, t wave inversions and troponin elevation she warrants ischemic workup  -Stress test tomorrow, NPO after midnight  -When ready for discharge please include referral for outpatient cardiology. Please include in discharge paperwork   Discussed with Dr. Betancourt   54F with HTN, congenital single kidney, p/w HTN emergency.  Elevated trop, nl CPK and NTproBNP.  No aortic dissection.    1) HTN emergency  2) Troponinemia    Recommendations:  -Troponin elevation unlikely ACS, non-correlating CKMB, downtrending now. May be demand ischemia in setting of uncontrolled HTN, MOOSE  -Continue current antihypertensive regimen, appears to be improving; goal BP < 130/80  -Lipid profile, A1c, TSH for risk stratification  -Maintain lytes at goal  -With patient having chest pain earlier this admission, t wave inversions and troponin elevation she warrants ischemic workup  -May do stress test as outpatient  -When ready for discharge please include referral for outpatient cardiology. Please include in discharge paperwork   Discussed with Dr. Betancourt   54F with HTN, congenital single kidney, p/w HTN emergency.  Elevated trop, nl CPK and NTproBNP.  No aortic dissection.    1) HTN emergency  2) Troponinemia    Recommendations:  -Troponin elevation unlikely ACS, non-correlating CKMB, downtrending now. May be demand ischemia in setting of uncontrolled HTN, MOOSE  -Continue current antihypertensive regimen, BP appears to be improving; goal BP < 130/80  -Lipid profile, A1c, TSH for risk stratification  -Maintain lytes at goal  -With patient having chest pain earlier this admission, t wave inversions and troponin elevation she warrants ischemic workup  -May do stress test as outpatient  -Patient scheduled for cardiology appt tomorrow with Dr. La, patient aware and will go   Discussed with Dr. Betancourt

## 2024-07-29 NOTE — DIETITIAN INITIAL EVALUATION ADULT - OTHER INFO
Per chart review, pt stopped taking anti-hypertensive meds and was using herbal teas(bush and garlic tea).  ICU diagnosis include hypertensive emergency, NSTEMI, MOOSE.   PMH include asthma, depression, anxiety.  Per pt., she is eating good @ present.

## 2024-07-29 NOTE — PROGRESS NOTE ADULT - ASSESSMENT
53 yo BF with h/o asthma (Albuterol), angina, HTN noncompliant with meds (pt has stopped taking all her anti-hypertensive meds for the last month and started taking herbal teas instead (bush and garlic tea) and reports baseline SBP at home is around 180-190), renal agenesis, depression, and anxiety BIBEMS 2 to CP, HA, nausea and dizziness. In the ER /141 with (+) increasing trop, EKG infero-lateral inverted T waves and Cr 1.53. ICU dx: HTN emergency with NSTEMI and MOOSE  Neuro: CHARITY  CVS: Pt with elevated trop but neg MB% ( ? MI was days ago)/ F/u as per Cardio: Change Coreg to Labetalol/ Cont Asa + Statin. Cardio following.  Heme: DVT prophylaxis wit sq Heparin  FEN: Cardiac prudent diet  Renal: Follow BUN/Cr  Social: Pt is full code/ OOB->chair and may ambulate/ transfer to medicine floor vs discharge if there is no plan for cardiac intervention.

## 2024-07-29 NOTE — PROGRESS NOTE ADULT - SUBJECTIVE AND OBJECTIVE BOX
Progress Note    VIVI WILKINS 54y (1970) Female [id number]  07-26-24 (3d)      Chief Complaint: HYPERTENSIVE EMERGENCY        Subjective:  Patient feeling well. No chest pain or trouble breathing. Wants to get out of chair today.    Review of Systems:  CONSTITUTIONAL: No fever, weight loss, or fatigue  EYES: No eye pain, visual disturbances, or discharge  ENMT:  No difficulty hearing, tinnitus, vertigo; No sinus or throat pain  NECK: No pain or stiffness  RESPIRATORY: No cough, wheezing, chills or hemoptysis; No shortness of breath  CARDIOVASCULAR: No chest pain, palpitations, dizziness, or leg swelling  GASTROINTESTINAL: No abdominal or epigastric pain. No nausea, vomiting, or hematemesis; No diarrhea or constipation. No melena or hematochezia.  GENITOURINARY: No dysuria, frequency, hematuria, or incontinence  NEUROLOGICAL: No headaches, memory loss, loss of strength, numbness, or tremors  SKIN: No itching, burning, rashes, or lesions   MUSCULOSKELETAL: No joint pain or swelling; No muscle, back, or extremity pain      PAST MEDICAL & SURGICAL HISTORY:  Essential hypertension [I10]    No significant past surgical history [[id number]]      amLODIPine   Tablet 10 milliGRAM(s) Oral daily  aspirin  chewable 81 milliGRAM(s) Oral daily  atorvastatin 40 milliGRAM(s) Oral at bedtime  chlorhexidine 2% Cloths 1 Application(s) Topical <User Schedule>  heparin   Injectable 5000 Unit(s) SubCutaneous every 12 hours  hydrALAZINE Injectable 5 milliGRAM(s) IV Push every 6 hours PRN  labetalol 200 milliGRAM(s) Oral two times a day  polyethylene glycol 3350 17 Gram(s) Oral daily  senna 2 Tablet(s) Oral at bedtime    Objective:  T(C): 36.4 (07-29-24 @ 11:20), Max: 36.7 (07-28-24 @ 16:00)  HR: 67 (07-29-24 @ 11:00) (59 - 76)  BP: 119/79 (07-29-24 @ 11:00) (112/76 - 173/93)  RR: 18 (07-29-24 @ 11:00) (12 - 21)  SpO2: 100% (07-29-24 @ 11:00) (93% - 100%)    Physical exam:  GENERAL: NAD, well-developed  HEAD:  Atraumatic, Normocephalic  EYES: EOMI, PERRLA, conjunctiva and sclera clear  NECK: Supple, No JVD  CHEST/LUNG: Clear to auscultation bilaterally; No wheeze  HEART: Regular rate and rhythm; No murmurs, rubs, or gallops  ABDOMEN: Soft, Nontender, Nondistended; Bowel sounds present  EXTREMITIES:  2+ Peripheral Pulses, No clubbing, cyanosis, or edema  PSYCH: AAOx3  NEUROLOGY: non-focal  SKIN: No rashes or lesions      07-28-24 @ 07:01  -  07-29-24 @ 07:00  --------------------------------------------------------  IN: 1020 mL / OUT: 175 mL / NET: 845 mL        CAPILLARY BLOOD GLUCOSE      (07-29 @ 04:25)                      10.2  7.00 )-----------( 308                 31.4    Neutrophils = -- (--%)  Lymphocytes = -- (--%)  Eosinophils = -- (--%)  Basophils = -- (--%)  Monocytes = -- (--%)  Bands = --%    07-29    137  |  108  |  22  ----------------------------<  101<H>  4.0   |  20<L>  |  1.53<H>    Ca    9.5      29 Jul 2024 04:25  Phos  4.5     07-29  Mg     2.0     07-29    TPro  6.6  /  Alb  2.9<L>  /  TBili  0.5  /  DBili  x   /  AST  13<L>  /  ALT  15  /  AlkPhos  75  07-29          RVP:          Tox:         Urinalysis Basic - ( 29 Jul 2024 04:25 )    Color: x / Appearance: x / SG: x / pH: x  Gluc: 101 mg/dL / Ketone: x  / Bili: x / Urobili: x   Blood: x / Protein: x / Nitrite: x   Leuk Esterase: x / RBC: x / WBC x   Sq Epi: x / Non Sq Epi: x / Bacteria: x        WBC Trend: 7.00<--, 7.71<--, 9.63<--    Hb Trend: 10.2<--, 10.7<--, 11.3<--, 11.3<--, 10.9<--        New imaging in last 24 hours:  Consult notes reviewed:

## 2024-07-29 NOTE — DIETITIAN INITIAL EVALUATION ADULT - NS FNS DIET ORDER
Diet, Renal Restrictions:   For patients receiving Renal Replacement - No Protein Restr, No Conc K, No Conc Phos, Low Sodium (07-26-24 @ 15:08)

## 2024-07-29 NOTE — DIETITIAN INITIAL EVALUATION ADULT - ORAL INTAKE PTA/DIET HISTORY
Pt reports depressed appetite @ times.  Diet PTA: unrestricted.  Pt's daughter did the shopping/cooking.  Pt without report of chewing/swallowing difficulty/food allergies/intolerances.

## 2024-07-29 NOTE — PROGRESS NOTE ADULT - REASON FOR ADMISSION
Hypertensive emergency, NSTEMI

## 2024-07-29 NOTE — DIETITIAN INITIAL EVALUATION ADULT - PERTINENT LABORATORY DATA
07-29    137  |  108  |  22  ----------------------------<  101<H>  4.0   |  20<L>  |  1.53<H>    Ca    9.5      29 Jul 2024 04:25  Phos  4.5     07-29  Mg     2.0     07-29    TPro  6.6  /  Alb  2.9<L>  /  TBili  0.5  /  DBili  x   /  AST  13<L>  /  ALT  15  /  AlkPhos  75  07-29   07-29    137  |  108  |  22  ----------------------------<  101<H>  4.0   |  20<L>  |  1.53<H>    Ca    9.5      29 Jul 2024 04:25  Phos  4.5     07-29  Mg     2.0     07-29    TPro  6.6  /  Alb  2.9<L>  /  TBili  0.5  /  DBili  x   /  AST  13<L>  /  ALT  15  /  AlkPhos  75  07-29 07/28, K 3.4 mmol/L <L> 07/26, K 2.9 mmol/L <L>

## 2024-07-29 NOTE — DISCHARGE NOTE PROVIDER - NSDCCPCAREPLAN_GEN_ALL_CORE_FT
PRINCIPAL DISCHARGE DIAGNOSIS  Diagnosis: Hypertensive emergency  Assessment and Plan of Treatment: Pt hypertensive from medication non compliance. Educated patient on the importance of taking all her anti-hyptertensive medications. Risks and benefits discussed. Please take blood pressure medications as directed; amlodipine and labetalol. Please follow up with your primary care physician for further management.      SECONDARY DISCHARGE DIAGNOSES  Diagnosis: MOOSE (acute kidney injury)  Assessment and Plan of Treatment: Likely in the setting of HTN. Now improving. Continue to monitor renal function with your primary care physician.    Diagnosis: NSTEMI (non-ST elevation myocardial infarction)  Assessment and Plan of Treatment: Cardiac enzymes now peaked. Chest pain resolved. Will need outpatient ischmia wokrup. Please attend scheduled cardiology appointment for tomorrow. All information on discharge paperwork. Will likely need stress test.

## 2024-07-29 NOTE — DISCHARGE NOTE PROVIDER - NSDCFUSCHEDAPPT_GEN_ALL_CORE_FT
[place]  [facility]  CARDIOLOGY [id number]  Scheduled Appointment: 07/30/2024

## 2024-07-29 NOTE — DISCHARGE NOTE NURSING/CASE MANAGEMENT/SOCIAL WORK - NSDCPEFALRISK_GEN_ALL_CORE
For information on Fall & Injury Prevention, visit: [email or web address] OR  [email or web address] OR  https://www.cdc.gov/steadi/patient.html

## 2024-07-29 NOTE — PROGRESS NOTE ADULT - SUBJECTIVE AND OBJECTIVE BOX
Patient is a 54y old  Female who presents with a chief complaint of Hypertensive emergency, NSTEMI (28 Jul 2024 15:28)    INTERVAL HISTORY:  TELEMETRY:     PAST MEDICAL & SURGICAL HISTORY:  Essential hypertension  No significant past surgical history      MEDICATIONS:  MEDICATIONS  (STANDING):  amLODIPine   Tablet 10 milliGRAM(s) Oral daily  aspirin  chewable 81 milliGRAM(s) Oral daily  atorvastatin 40 milliGRAM(s) Oral at bedtime  chlorhexidine 2% Cloths 1 Application(s) Topical <User Schedule>  heparin   Injectable 5000 Unit(s) SubCutaneous every 12 hours  labetalol 200 milliGRAM(s) Oral two times a day    MEDICATIONS  (PRN):  hydrALAZINE Injectable 5 milliGRAM(s) IV Push every 6 hours PRN Systolic > 180      Vitals:  T(F): 97.5 (07-29-24 @ 07:10), Max: 98 (07-28-24 @ 16:00)  HR: 67 (07-29-24 @ 08:00) (59 - 75)  BP: 145/85 (07-29-24 @ 08:00) (131/79 - 173/93)  RR: 17 (07-29-24 @ 08:00) (11 - 21)  SpO2: 94% (07-29-24 @ 08:00) (93% - 100%)  Wt(kg): --    07-28 @ 07:01  -  07-29 @ 07:00  --------------------------------------------------------  IN:    Oral Fluid: 1020 mL  Total IN: 1020 mL    OUT:    Voided (mL): 175 mL  Total OUT: 175 mL    Total NET: 845 mL        Weight (kg): 74.6 (07-26 @ 15:48)  BMI (kg/m2): 26.6 (07-26 @ 15:48)    PHYSICAL EXAM:  Neuro: Awake, responsive  CV: + S1 S2 RRR  Lungs: CTABL  GI: Soft, BS x4, ND, NT  Extremities: No edema  	    ECG:      DIAGNOSTIC TESTING:    [x] Echocardiogram: < from: TTE Echo Complete w/o Contrast w/ Doppler (07.26.24 @ 20:22) >  CONCLUSIONS:     1. Left ventricular systolic function is normal with an ejection   fraction visually estimated at 60 to 65 %.   2. Mild left ventricular hypertrophy.   3. There is mild (grade 1) left ventricular diastolic dysfunction.   4. Left atrium is mildly dilated.   5. Mild aortic stenosis.   6. The peak transaortic velocity is 2.24 m/s, peak transaortic gradient   is 20.1 mmHg and mean transaortic gradient is 10.7 mmHg with an   LVOT/aortic valve VTI ratio of 0.68.   7. Normal right ventricular cavity size, with normal wall thickness, and   normal right ventricular systolic function.   8. Trace pericardial effusion with no evidence of hemodynamic compromise   (or echocardiographic evidence of cardiac tamponade).   9. Technically difficult image quality.    < end of copied text >    [ ] Cardiac Catheterization:  [ ] Stress Test:      LABS:	 	    CARDIAC MARKERS:          29 Jul 2024 04:25    137    |  108    |  22     ----------------------------<  101    4.0     |  20     |  1.53   28 Jul 2024 03:45    138    |  108    |  22     ----------------------------<  108    3.4     |  23     |  1.75   27 Jul 2024 02:30    140    |  108    |  17     ----------------------------<  129    4.1     |  26     |  1.63     Ca    9.5        29 Jul 2024 04:25  Phos  4.5       29 Jul 2024 04:25  Mg     2.0       29 Jul 2024 04:25    TPro  6.6    /  Alb  2.9    /  TBili  0.5    /  DBili  x      /  AST  13     /  ALT  15     /  AlkPhos  75     29 Jul 2024 04:25                          10.2   7.00  )-----------( 308      ( 29 Jul 2024 04:25 )             31.4 ,                       10.7   7.71  )-----------( 396      ( 28 Jul 2024 03:45 )             33.6 ,                       11.3   9.63  )-----------( 387      ( 27 Jul 2024 02:30 )             36.0 ,                       11.3   10.35 )-----------( 368      ( 26 Jul 2024 16:42 )             35.4 ,                       10.9   9.13  )-----------( CLUMPED    ( 26 Jul 2024 12:10 )             33.7   proBNP: Pro-Brain Natriuretic Peptide: 330 pg/mL (07.26.24 @ 12:10)              Patient is a 54y old  Female who presents with a chief complaint of Hypertensive emergency, NSTEMI (28 Jul 2024 15:28)    INTERVAL HISTORY: No overnight events, no complaints  TELEMETRY: NSR in 70s, no events    PAST MEDICAL & SURGICAL HISTORY:  Essential hypertension  No significant past surgical history      MEDICATIONS:  MEDICATIONS  (STANDING):  amLODIPine   Tablet 10 milliGRAM(s) Oral daily  aspirin  chewable 81 milliGRAM(s) Oral daily  atorvastatin 40 milliGRAM(s) Oral at bedtime  chlorhexidine 2% Cloths 1 Application(s) Topical <User Schedule>  heparin   Injectable 5000 Unit(s) SubCutaneous every 12 hours  labetalol 200 milliGRAM(s) Oral two times a day    MEDICATIONS  (PRN):  hydrALAZINE Injectable 5 milliGRAM(s) IV Push every 6 hours PRN Systolic > 180      Vitals:  T(F): 97.5 (07-29-24 @ 07:10), Max: 98 (07-28-24 @ 16:00)  HR: 67 (07-29-24 @ 08:00) (59 - 75)  BP: 145/85 (07-29-24 @ 08:00) (131/79 - 173/93)  RR: 17 (07-29-24 @ 08:00) (11 - 21)  SpO2: 94% (07-29-24 @ 08:00) (93% - 100%)  Wt(kg): --    07-28 @ 07:01  -  07-29 @ 07:00  --------------------------------------------------------  IN:    Oral Fluid: 1020 mL  Total IN: 1020 mL    OUT:    Voided (mL): 175 mL  Total OUT: 175 mL    Total NET: 845 mL    Weight (kg): 74.6 (07-26 @ 15:48)  BMI (kg/m2): 26.6 (07-26 @ 15:48)    PHYSICAL EXAM:  Neuro: Awake, responsive  CV: + S1 S2 RRR  Lungs: CTABL  GI: Soft, BS x4, ND, NT  Extremities: No edema  	    ECG: < from: 12 Lead ECG (07.26.24 @ 13:54) >  Diagnosis Line Normal sinus rhythm  Possible Left atrial enlargement  Cannot rule out Anterior infarct , age undetermined  T wave abnormality, consider lateral ischemia  Abnormal ECG  When compared with ECG of 26-JUL-2024 11:39,  No significant change was found  Confirmed by Kavin Ware MD ([id number]) on 7/26/2024 10:02:31 PM    < end of copied text >      DIAGNOSTIC TESTING:    [x] Echocardiogram: < from: TTE Echo Complete w/o Contrast w/ Doppler (07.26.24 @ 20:22) >  CONCLUSIONS:     1. Left ventricular systolic function is normal with an ejection   fraction visually estimated at 60 to 65 %.   2. Mild left ventricular hypertrophy.   3. There is mild (grade 1) left ventricular diastolic dysfunction.   4. Left atrium is mildly dilated.   5. Mild aortic stenosis.   6. The peak transaortic velocity is 2.24 m/s, peak transaortic gradient   is 20.1 mmHg and mean transaortic gradient is 10.7 mmHg with an   LVOT/aortic valve VTI ratio of 0.68.   7. Normal right ventricular cavity size, with normal wall thickness, and   normal right ventricular systolic function.   8. Trace pericardial effusion with no evidence of hemodynamic compromise   (or echocardiographic evidence of cardiac tamponade).   9. Technically difficult image quality.    < end of copied text >    [ ] Cardiac Catheterization:  [ ] Stress Test:      LABS:	 	    CARDIAC MARKERS:          29 Jul 2024 04:25    137    |  108    |  22     ----------------------------<  101    4.0     |  20     |  1.53   28 Jul 2024 03:45    138    |  108    |  22     ----------------------------<  108    3.4     |  23     |  1.75   27 Jul 2024 02:30    140    |  108    |  17     ----------------------------<  129    4.1     |  26     |  1.63     Ca    9.5        29 Jul 2024 04:25  Phos  4.5       29 Jul 2024 04:25  Mg     2.0       29 Jul 2024 04:25    TPro  6.6    /  Alb  2.9    /  TBili  0.5    /  DBili  x      /  AST  13     /  ALT  15     /  AlkPhos  75     29 Jul 2024 04:25                          10.2   7.00  )-----------( 308      ( 29 Jul 2024 04:25 )             31.4 ,                       10.7   7.71  )-----------( 396      ( 28 Jul 2024 03:45 )             33.6 ,                       11.3   9.63  )-----------( 387      ( 27 Jul 2024 02:30 )             36.0 ,                       11.3   10.35 )-----------( 368      ( 26 Jul 2024 16:42 )             35.4 ,                       10.9   9.13  )-----------( CLUMPED    ( 26 Jul 2024 12:10 )             33.7   proBNP: Pro-Brain Natriuretic Peptide: 330 pg/mL (07.26.24 @ 12:10)

## 2024-07-29 NOTE — DISCHARGE NOTE NURSING/CASE MANAGEMENT/SOCIAL WORK - PATIENT PORTAL LINK FT
You can access the FollowMyHealth Patient Portal offered by [facility] by registering at the following website: [email or web address]. By joining [employer]’s FollowMyHealth portal, you will also be able to view your health information using other applications (apps) compatible with our system.

## 2024-07-29 NOTE — DISCHARGE NOTE PROVIDER - NSFOLLOWUPCLINICS_GEN_ALL_ED_FT
[facility] [place]  Internal Medicine  [address] [place] [address]  Phone: [phone]  Fax: [phone]  Follow Up Time: 1-3 days

## 2024-07-29 NOTE — PROGRESS NOTE ADULT - NS ATTEND AMEND GEN_ALL_CORE FT
BP better controlled.  Consider increasing labetalol to 300 mg po bid.  Can get stress test as outpt.  D/c planning.

## 2024-07-29 NOTE — DISCHARGE NOTE PROVIDER - HOSPITAL COURSE
HPI:  54 year old female with a PMHX HTN noncompliant with medications, angina, renal agenesis, asthma (albuterol at home), depression, and anxiety presents to the [facility] w/ headache, nausea, and vomiting. Pt states she was visiting a friend today and went up 4 steps when she suddenly developed chest pain, severe headache, N/V. On arrival EMS found BP to be 240/120, pt was given 3 so sublingual nitro. Pt has stopped taking all her anti-hypertensive meds for the last month and started taking herbal teas instead (bush and garlic tea). Reports baseline BP at home is around 180-190. Describes chest pain as pressure sensation. Denies chills, fever, dizziness, abdominal pain.     In ED pt with received morphine with resolution of chest pain and headache. S/p aspirin and 10mg hydralazine. CT head/chest/abd/pelvis with no acute findings. Labs remarkable for troponins 3517.4, CK; 207, BNP: 330, Cr; 1.53. Admit to ICU for further management.  (26 Jul 2024 15:10)    Brief Hospital Course:  Pt admitted for ICU HTN emergency with NSTEMI and MOOSE. Pt on PO antihypertensives, BP now well controlled on current regimen. Troponins now peaked. Cardiology actively following; Troponin elevation unlikely ACS, non-correlating CKMB, downtrending now. May be demand ischemia in setting of uncontrolled HTN, MOOSE. Plan is for outpatient stress test and further cardiology follow up. Pt hemodynamically stable. Awake and alert, states she feels better and wants to go home.     Plan:  Neuro: CHARITY, A&OX4  CVS: Pt with elevated trop but neg MB% ( ? MI was days ago), troponins now peaked. Chest pain resolved. C/w Labetalol/ Cont Asa + Statin. C/w po antihypertensive meds. Cardio following. plan is for stress test tomorrow.   Heme: DVT prophylaxis wit sq Heparin  FEN: Cardiac prudent diet  Renal: Follow BUN/Cr    Problem List:   1; Hypertensive Emergency  2; NSTEMI     Pt will need to follow up with outpatient cardiology, appointment has been scheduled. Pt is to also follow up with a primary care physician.

## 2024-07-29 NOTE — DIETITIAN INITIAL EVALUATION ADULT - NS FNS WEIGHT CHANGE REASON
Per pt., her wt. fluctuates up and down, usual wt. is between 171-180 LBS.  As per current adm wt. 07/26, 74.6 kg/164.4 LBS, wt. loss noted from reported usual wt.,  however, ? accuracy of current wt./unintentional

## 2024-07-29 NOTE — DISCHARGE NOTE PROVIDER - NSDCMRMEDTOKEN_GEN_ALL_CORE_FT
amLODIPine 10 mg oral tablet: 1 tab(s) orally once a day  aspirin 81 mg oral tablet, chewable: 1 tab(s) orally once a day  atorvastatin 40 mg oral tablet: 1 tab(s) orally once a day (at bedtime)  labetalol 200 mg oral tablet: 1 tab(s) orally 2 times a day

## 2024-07-29 NOTE — DIETITIAN INITIAL EVALUATION ADULT - OTHER CALCULATIONS
Current adm wt.   07/26, 74.6 kg/164.4 LBS(drug dose wt.)  07/27, 69 kg/152.1 LBS, 07/29, 71.4 kg/152.1 LBS(daily wt.), wt. fluctuations noted, ? wt. accuracy

## 2024-07-29 NOTE — CHART NOTE - NSCHARTNOTEFT_GEN_A_CORE
Admitted MD: Dr. Nuñez  Case discussed with Dr. Arias    HPI:  54 year old female with a PMHX HTN noncompliant with medications, angina, renal agenesis, asthma (albuterol at home), depression, and anxiety presents to the [facility] w/ headache, nausea, and vomiting. Pt states she was visiting a friend today and went up 4 steps when she suddenly developed chest pain, severe headache, N/V. On arrival EMS found BP to be 240/120, pt was given 3 so sublingual nitro. Pt has stopped taking all her anti-hypertensive meds for the last month and started taking herbal teas instead (bush and garlic tea). Reports baseline BP at home is around 180-190. Describes chest pain as pressure sensation. Denies chills, fever, dizziness, abdominal pain.     In ED pt with received morphine with resolution of chest pain and headache. S/p aspirin and 10mg hydralazine. CT head/chest/abd/pelvis with no acute findings. Labs remarkable for troponins 3517.4, CK; 207, BNP: 330, Cr; 1.53. Admit to ICU for further management.  (26 Jul 2024 15:10)    Brief Hospital Course: Admitted MD: Dr. Nuñez  Case discussed with Dr. Arias    HPI:  54 year old female with a PMHX HTN noncompliant with medications, angina, renal agenesis, asthma (albuterol at home), depression, and anxiety presents to the [facility] w/ headache, nausea, and vomiting. Pt states she was visiting a friend today and went up 4 steps when she suddenly developed chest pain, severe headache, N/V. On arrival EMS found BP to be 240/120, pt was given 3 so sublingual nitro. Pt has stopped taking all her anti-hypertensive meds for the last month and started taking herbal teas instead (bush and garlic tea). Reports baseline BP at home is around 180-190. Describes chest pain as pressure sensation. Denies chills, fever, dizziness, abdominal pain.     In ED pt with received morphine with resolution of chest pain and headache. S/p aspirin and 10mg hydralazine. CT head/chest/abd/pelvis with no acute findings. Labs remarkable for troponins 3517.4, CK; 207, BNP: 330, Cr; 1.53. Admit to ICU for further management.  (26 Jul 2024 15:10)    Brief Hospital Course:  Pt admitted for ICU HTN emergency with NSTEMI and MOOSE. Pt on PO antihypertensives, BP now well controlled on current regimen. Troponins now peaked. Cardiology actively following. Pt hemodynamically stable. Awake and alert, states she feels better and wants to go home.     To follow up:     Neuro: CHARITY, A&OX4  [facility]: Pt with elevated trop but neg MB% ( ? MI was days ago), troponins now peaked. Chest pain resolved. C/w Labetalol/ Cont Asa + Statin. C/w po antihypertensive meds. Cardio following. plan is for stress test tomorrow.   Heme: DVT prophylaxis wit sq Heparin  FEN: Cardiac prudent diet  Renal: Follow BUN/Cr    Pt hemodynamically stable, no longer requires ICU level of care and is stable for downgrade to medicine service. Case discussed with ICU attending, Dr. Rodriguez and hospitalist.

## 2024-07-30 ENCOUNTER — APPOINTMENT (OUTPATIENT)
Dept: CARDIOLOGY | Facility: CLINIC | Age: 54
End: 2024-07-30
Payer: MEDICAID

## 2024-07-30 ENCOUNTER — NON-APPOINTMENT (OUTPATIENT)
Age: 54
End: 2024-07-30

## 2024-07-30 VITALS
OXYGEN SATURATION: 100 % | RESPIRATION RATE: 16 BRPM | SYSTOLIC BLOOD PRESSURE: 110 MMHG | HEIGHT: 65 IN | DIASTOLIC BLOOD PRESSURE: 70 MMHG | TEMPERATURE: 98.1 F | WEIGHT: 168 LBS | BODY MASS INDEX: 27.99 KG/M2 | HEART RATE: 67 BPM

## 2024-07-30 DIAGNOSIS — R94.31 ABNORMAL ELECTROCARDIOGRAM [ECG] [EKG]: ICD-10-CM

## 2024-07-30 DIAGNOSIS — I10 ESSENTIAL (PRIMARY) HYPERTENSION: ICD-10-CM

## 2024-07-30 PROCEDURE — 93000 ELECTROCARDIOGRAM COMPLETE: CPT

## 2024-07-30 PROCEDURE — 99204 OFFICE O/P NEW MOD 45 MIN: CPT

## 2024-07-30 PROCEDURE — G2211 COMPLEX E/M VISIT ADD ON: CPT | Mod: NC,1L

## 2024-07-30 RX ORDER — AMLODIPINE BESYLATE 10 MG/1
10 TABLET ORAL
Qty: 90 | Refills: 1 | Status: ACTIVE | COMMUNITY
Start: 2024-07-30 | End: 1900-01-01

## 2024-07-30 RX ORDER — LABETALOL HYDROCHLORIDE 200 MG/1
200 TABLET, FILM COATED ORAL
Qty: 180 | Refills: 1 | Status: ACTIVE | COMMUNITY
Start: 2024-07-30 | End: 1900-01-01

## 2024-07-30 NOTE — PHYSICAL EXAM
[Well Nourished] : well nourished [Normal Venous Pressure] : normal venous pressure [Normal S1, S2] : normal S1, S2 [No Murmur] : no murmur [Clear Lung Fields] : clear lung fields [Good Air Entry] : good air entry [No Respiratory Distress] : no respiratory distress  [Soft] : abdomen soft [Non Tender] : non-tender [No Edema] : no edema [Moves all extremities] : moves all extremities [No Focal Deficits] : no focal deficits [Alert and Oriented] : alert and oriented

## 2024-07-30 NOTE — DISCUSSION/SUMMARY
Known aspiration. Declines thickener.   [FreeTextEntry1] : 54F HTN congenital single kidney presents to establish care  uncontrolled HTN -hospitalized last friday with HTN emergency, now discharged with bp 110 systolic -unclear on hospital course, pt poor historian -on Norvasc 10 and labetalol 200 bid, pt dizzy, suspect relative hypotension -recommended decreasing norvsac to 5 for now, pt wants to keep it at 10 -tte from hospital reviewed -f/u here in 1 month to check bp -pt advised to return to ER if not feeling well -given twi on ekg ion ER, will consider eventual ischemic eval when bp further optimized. suspect ekg changes related to sbp 240, TWI mostly resolved on todays ekg   50 min spent on complete encounter  [EKG obtained to assist in diagnosis and management of assessed problem(s)] : EKG obtained to assist in diagnosis and management of assessed problem(s)

## 2024-07-30 NOTE — REVIEW OF SYSTEMS
[Fever] : no fever [Headache] : no headache [Weight Gain (___ Lbs)] : no recent weight gain [Chills] : no chills [Feeling Fatigued] : feeling fatigued [Weight Loss (___ Lbs)] : no recent weight loss [Blurry Vision] : no blurred vision [Sore Throat] : no sore throat [SOB] : no shortness of breath [Dyspnea on exertion] : not dyspnea during exertion [Chest Discomfort] : no chest discomfort [Lower Ext Edema] : no extremity edema [Palpitations] : no palpitations [Orthopnea] : no orthopnea [Syncope] : no syncope [Cough] : no cough [Wheezing] : no wheezing [Nausea] : no nausea [Vomiting] : no vomiting [Dizziness] : dizziness [Confusion] : no confusion was observed [Easy Bleeding] : no tendency for easy bleeding [Easy Bruising] : no tendency for easy bruising

## 2024-07-30 NOTE — HISTORY OF PRESENT ILLNESS
[FreeTextEntry1] : 54F HTN congenital single kidney presents to establish care Sent in by: [facility] PMD:  pt recently hospitalized last week at [facility] with HTN emergency 229/141, not interested in meds, takes supplements. does not check her bp at home. pt states she has been hospitalized prev for HTN. Cr 1.53 trop 3517. , EKG with TWI . +HA and blurry vision TTE there showing LV EF 60%, mild AS, mild LVH   pt now here for outpt care.  pt states she feels weak and lightheaded since her hospital discharge. no cp or sob. HA improved.  sent home on norvasc 10 and labetalol 200 bid, states good compliance.   Denies palpitations, dizziness, diaphoresis, syncope, LE edema, orthopnea  Exercise: none Diet: none   Prev cardiac history: Previous cardiac testing: Recent labs:  EKG: SR, poor r wave progression   Med hx:	HTN congenital single kidney OBGYN hx: 1 daughter. No Hx of gestational DM, gestational HTN, preeclampsia.  + Hx of miscarriage Sx hx: spine sx hysterectomy 2016	 Family hx: M: cv dz Social hx:  lives in [facility], with daughter. not working. no tob/etoh/drugs Meds: labetelol 200 bid norvasc 10 lipitor 80  Allergies: nkda

## 2024-08-05 DIAGNOSIS — J45.909 UNSPECIFIED ASTHMA, UNCOMPLICATED: ICD-10-CM

## 2024-08-05 DIAGNOSIS — F32.A DEPRESSION, UNSPECIFIED: ICD-10-CM

## 2024-08-05 DIAGNOSIS — I10 ESSENTIAL (PRIMARY) HYPERTENSION: ICD-10-CM

## 2024-08-05 DIAGNOSIS — N17.9 ACUTE KIDNEY FAILURE, UNSPECIFIED: ICD-10-CM

## 2024-08-05 DIAGNOSIS — I16.1 HYPERTENSIVE EMERGENCY: ICD-10-CM

## 2024-08-05 DIAGNOSIS — F41.9 ANXIETY DISORDER, UNSPECIFIED: ICD-10-CM

## 2024-08-05 DIAGNOSIS — Q60.2 RENAL AGENESIS, UNSPECIFIED: ICD-10-CM

## 2024-08-05 DIAGNOSIS — I21.4 NON-ST ELEVATION (NSTEMI) MYOCARDIAL INFARCTION: ICD-10-CM

## 2024-08-05 DIAGNOSIS — Z91.148 PATIENT'S OTHER NONCOMPLIANCE WITH MEDICATION REGIMEN FOR OTHER REASON: ICD-10-CM

## 2024-08-22 NOTE — HISTORY OF PRESENT ILLNESS
[FreeTextEntry1] : 54F HTN congenital single kidney presents to establish care Sent in by: [facility] PMD:  Previously, pt last seen r7/24 following a hospitalization [facility] with HTN emergency 229/141, not interested in meds, takes supplements. does not check her bp at home. pt states she has been hospitalized prev for HTN. Cr 1.53 trop 3517. , EKG with TWI . +HA and blurry vision TTE there showing LV EF 60%, mild AS, mild LVH sent home on norvasc 10 and labetalol 200 bid, states good compliance.  pt now returns for follow up today,      bp check  dizziness?     given twi on ekg on ER, will consider eventual ischemic eval when bp further optimized. suspect ekg changes related to sbp 240, TWI mostly resolved on initial eval ekg    Denies palpitations, dizziness, diaphoresis, syncope, LE edema, orthopnea  Exercise: none Diet: none   Prev cardiac history: Previous cardiac testing: Recent labs:  EKG: SR, poor r wave progression  Med hx: HTN congenital single kidney OBGYN hx: 1 daughter. No Hx of gestational DM, gestational HTN, preeclampsia. + Hx of miscarriage Sx hx: spine sx hysterectomy 2016  Family hx: M: cv dz Social hx: lives in [place], with daughter. not working. no tob/etoh/drugs Meds: labetelol 200 bid norvasc 10 lipitor 80 Allergies: nkda

## 2024-08-29 ENCOUNTER — APPOINTMENT (OUTPATIENT)
Dept: CARDIOLOGY | Facility: CLINIC | Age: 54
End: 2024-08-29

## 2024-11-19 ENCOUNTER — NON-APPOINTMENT (OUTPATIENT)
Age: 54
End: 2024-11-19